# Patient Record
Sex: FEMALE | Race: WHITE | NOT HISPANIC OR LATINO | Employment: OTHER | URBAN - METROPOLITAN AREA
[De-identification: names, ages, dates, MRNs, and addresses within clinical notes are randomized per-mention and may not be internally consistent; named-entity substitution may affect disease eponyms.]

---

## 2021-04-04 ENCOUNTER — HOSPITAL ENCOUNTER (EMERGENCY)
Facility: CLINIC | Age: 66
Discharge: HOME | End: 2021-04-04
Attending: PHYSICIAN ASSISTANT | Admitting: PHYSICIAN ASSISTANT
Payer: COMMERCIAL

## 2021-04-04 VITALS
RESPIRATION RATE: 16 BRPM | SYSTOLIC BLOOD PRESSURE: 148 MMHG | HEART RATE: 82 BPM | OXYGEN SATURATION: 98 % | TEMPERATURE: 97.9 F | DIASTOLIC BLOOD PRESSURE: 115 MMHG

## 2021-04-04 DIAGNOSIS — T65.91XA: ICD-10-CM

## 2021-04-04 PROCEDURE — 99282 EMERGENCY DEPT VISIT SF MDM: CPT

## 2021-04-04 ASSESSMENT — ENCOUNTER SYMPTOMS
WOUND: 1
FEVER: 0
SHORTNESS OF BREATH: 0

## 2021-04-05 NOTE — DISCHARGE INSTRUCTIONS
You should take cetirizine (Zyrtec) 10 mg twice daily and famotidine (Pepcid) 20 mg twice daily as needed.  If your rash and itching become unbearable you can remove the Steri-Strips.  You should call your surgeon tomorrow.  You should return for fevers, shortness of breath, vomiting, facial swelling, significant worsening of rash, or other new or worsening symptoms you are concerned about.

## 2021-04-05 NOTE — ED PROVIDER NOTES
History   Chief Complaint:  Rash     The history is provided by the patient.      Vi Gee is a 66 year old female with a history of Parkinson's who presents with skin irritation. The patient reports that she recently had surgery on 03/31 to have the batteries replaced on her deep brain stimulation device. Her sites were dressed with surgical tape and gauze. She removed the gauze after two days as instructed, and was advised to leave the steri-strips on for 7-10 days following the procedure. The skin underneath the adhesive strips began itching as of two days ago, and has been worsening since. The patient voices concern that she may be allergic to the adhesive. She was prescribed antibiotics following her surgery but has not tried taking any medications to try and manage her symptoms. The patient denies any fever or shortness of breath. She has no rash elsewhere and denies drainage from her surgical sites.    Review of Systems   Constitutional: Negative for fever.   Respiratory: Negative for shortness of breath.    Skin: Positive for rash (under steri strips, denies rash elsewhere) and wound (denies drainage around sites).   All other systems reviewed and are negative.    Allergies:  Celecoxib  Clindamycin/Lincomycin  Epinephrine  Erythromycin    Medications:  Fluoxetine  Albuterol inhaler  Trazodone  Ropinirole  Tizanidine  Gabapentin  Sinemet  Duloxetine  Clonazepam    Past Medical History:    Parkinson's with dementia  Anxiety  COPD  Hyperparathyroidism  RLS  IBS  Chronic diarrhea  Hyperlipidemia  Stroke  Depression    Past Surgical History:    Deep brain stimulator placement  Appendectomy  Tonsillectomy and adenoidectomy  Left wrist procedure  Endometrial ablation  Uterine ablation  Vaginal mesh  Parathyroidectomy  Tubal ligation  Obstetrical surgery x3  [place] teeth extraction  Retinal reattachment  Bilateral cataract extraction  Bladder sling  Skin cancer excision  Craniectomy/craniotomy    Family  History:    Father - lung cancer, cataract, depression, IBS, macular degeneration  Mother - COPD, dementia, substance abuse, asthma, lung cancer, cataract, CAD, depression, hypertension, migraines, osteoarthritis   Sister - Parkinsonism, migraines    Social History:  The patient was accompanied to the ED by her .  Marital status:     Physical Exam     Patient Vitals for the past 24 hrs:   BP Temp Temp src Pulse Resp SpO2   04/04/21 1904 (!) 148/115 97.9  F (36.6  C) Temporal 82 16 98 %       Physical Exam  General: Alert and cooperative with exam. Resting comfortably on gurney  Head:  Scalp is NC/AT  Eyes:  No scleral icterus, PERRL with normal tracking  ENT:  The external nose and ears are normal. No angioedema of tongue, lips, uvula.  Neck:  Normal range of motion without rigidity.  Chest:  Normal effort.  No tachypnea or distress.  Skin:  There is mild redness surrounding 2 incision sites on either side of the upper anterior chest wall overlying stimulator implants.  This is symmetric.  Steri-strips overlying both suture sites.  No visualized purulence, fluctuance, crepiance or ttp.  Neuro:  No gross motor deficits.    No facial asymmetry.  GCS: 15.   Psych: Awake. Alert. Normal affect. Appropriate interactions.          Emergency Department Course     Emergency Department Course:    Reviewed:  I reviewed the patient's nursing notes, vitals, past medical history and care everywhere.     Assessments:  1949 I performed an exam of the patient in room 36 as documented above.    Disposition:  The patient was discharged to home.     Impression & Plan   Medical Decision Making:  Vi Gee is a 66 year old female who presents with redness at sites of steri-strips over BL chest wall sites.  This is consistent with localized allergic skin reaction.  This does seem most likely secondary to steri-strips.  Offered to remove strips but pt prefers to leave on per surgeon instructions.  Will treat  symptomatically with antihistamines.  She will call her surgeon tomorrow to discuss whether she can remove steri-strips.  No evidence of cellulitis, or wound infection.  No signs of candidal infection.  No evidence of more generalized allergic reaction. Return precautions given for spreading redness, swelling, purulent drainage, fever, difficulty breathing, vomiting, dizziness, generalized rash, etc.  Diagnosis:    ICD-10-CM    1. Allergic reaction to chemical substance  T65.91XA        Discharge Medications:   None.      Scribe Disclosure:  I, Elizabeth Bragg, am serving as a scribe at 7:49 PM on 4/4/2021 to document services personally performed by Vahe Jama PA-C based on my observations and the provider's statements to me.     This note was completed in part using Dragon voice recognition software. Although reviewed after completion, some word and grammatical errors may occur.     Elizabeth Bragg  4/4/2021   [facility] EMERGENCY DEPARTMENT         Vahe Jama PA-C  04/05/21 1218

## 2021-06-07 VITALS
TEMPERATURE: 97.3 F | HEART RATE: 99 BPM | DIASTOLIC BLOOD PRESSURE: 79 MMHG | RESPIRATION RATE: 22 BRPM | SYSTOLIC BLOOD PRESSURE: 178 MMHG | OXYGEN SATURATION: 94 %

## 2021-06-07 LAB
ANION GAP SERPL CALCULATED.3IONS-SCNC: 2 MMOL/L (ref 3–14)
BASOPHILS # BLD AUTO: 0 10E9/L (ref 0–0.2)
BASOPHILS NFR BLD AUTO: 0.4 %
BUN SERPL-MCNC: 14 MG/DL (ref 7–30)
CALCIUM SERPL-MCNC: 8.7 MG/DL (ref 8.5–10.1)
CHLORIDE SERPL-SCNC: 108 MMOL/L (ref 94–109)
CO2 SERPL-SCNC: 32 MMOL/L (ref 20–32)
CREAT SERPL-MCNC: 0.94 MG/DL (ref 0.52–1.04)
DIFFERENTIAL METHOD BLD: NORMAL
EOSINOPHIL # BLD AUTO: 0.2 10E9/L (ref 0–0.7)
EOSINOPHIL NFR BLD AUTO: 2.3 %
ERYTHROCYTE [DISTWIDTH] IN BLOOD BY AUTOMATED COUNT: 13.2 % (ref 10–15)
GFR SERPL CREATININE-BSD FRML MDRD: 63 ML/MIN/{1.73_M2}
GLUCOSE SERPL-MCNC: 146 MG/DL (ref 70–99)
HCT VFR BLD AUTO: 40.8 % (ref 35–47)
HGB BLD-MCNC: 13.3 G/DL (ref 11.7–15.7)
IMM GRANULOCYTES # BLD: 0 10E9/L (ref 0–0.4)
IMM GRANULOCYTES NFR BLD: 0.4 %
LYMPHOCYTES # BLD AUTO: 1.8 10E9/L (ref 0.8–5.3)
LYMPHOCYTES NFR BLD AUTO: 21.9 %
MCH RBC QN AUTO: 29.5 PG (ref 26.5–33)
MCHC RBC AUTO-ENTMCNC: 32.6 G/DL (ref 31.5–36.5)
MCV RBC AUTO: 91 FL (ref 78–100)
MONOCYTES # BLD AUTO: 0.4 10E9/L (ref 0–1.3)
MONOCYTES NFR BLD AUTO: 5.1 %
NEUTROPHILS # BLD AUTO: 5.8 10E9/L (ref 1.6–8.3)
NEUTROPHILS NFR BLD AUTO: 69.9 %
NRBC # BLD AUTO: 0 10*3/UL
NRBC BLD AUTO-RTO: 0 /100
PLATELET # BLD AUTO: 279 10E9/L (ref 150–450)
POTASSIUM SERPL-SCNC: 3.5 MMOL/L (ref 3.4–5.3)
RBC # BLD AUTO: 4.51 10E12/L (ref 3.8–5.2)
SODIUM SERPL-SCNC: 142 MMOL/L (ref 133–144)
WBC # BLD AUTO: 8.2 10E9/L (ref 4–11)

## 2021-06-07 PROCEDURE — 99283 EMERGENCY DEPT VISIT LOW MDM: CPT | Mod: 25

## 2021-06-07 PROCEDURE — 80076 HEPATIC FUNCTION PANEL: CPT | Performed by: EMERGENCY MEDICINE

## 2021-06-07 PROCEDURE — 96360 HYDRATION IV INFUSION INIT: CPT

## 2021-06-07 PROCEDURE — 83690 ASSAY OF LIPASE: CPT | Performed by: EMERGENCY MEDICINE

## 2021-06-07 PROCEDURE — 80048 BASIC METABOLIC PNL TOTAL CA: CPT | Performed by: EMERGENCY MEDICINE

## 2021-06-07 PROCEDURE — 85025 COMPLETE CBC W/AUTO DIFF WBC: CPT | Performed by: EMERGENCY MEDICINE

## 2021-06-08 ENCOUNTER — HOSPITAL ENCOUNTER (EMERGENCY)
Facility: CLINIC | Age: 66
Discharge: HOME | End: 2021-06-08
Attending: EMERGENCY MEDICINE | Admitting: EMERGENCY MEDICINE
Payer: COMMERCIAL

## 2021-06-08 DIAGNOSIS — R51.9 NONINTRACTABLE HEADACHE, UNSPECIFIED CHRONICITY PATTERN, UNSPECIFIED HEADACHE TYPE: ICD-10-CM

## 2021-06-08 DIAGNOSIS — R19.7 DIARRHEA, UNSPECIFIED TYPE: ICD-10-CM

## 2021-06-08 DIAGNOSIS — B35.9 TINEA: ICD-10-CM

## 2021-06-08 LAB
ALBUMIN SERPL-MCNC: 3.5 G/DL (ref 3.4–5)
ALP SERPL-CCNC: 90 U/L (ref 40–150)
ALT SERPL W P-5'-P-CCNC: 11 U/L (ref 0–50)
AST SERPL W P-5'-P-CCNC: 24 U/L (ref 0–45)
BILIRUB DIRECT SERPL-MCNC: <0.1 MG/DL (ref 0–0.2)
BILIRUB SERPL-MCNC: 0.2 MG/DL (ref 0.2–1.3)
LIPASE SERPL-CCNC: 134 U/L (ref 73–393)
PROT SERPL-MCNC: 7.5 G/DL (ref 6.8–8.8)

## 2021-06-08 PROCEDURE — 250N000013 HC RX MED GY IP 250 OP 250 PS 637: Performed by: EMERGENCY MEDICINE

## 2021-06-08 PROCEDURE — 258N000003 HC RX IP 258 OP 636: Performed by: EMERGENCY MEDICINE

## 2021-06-08 RX ORDER — ACETAMINOPHEN 325 MG/1
975 TABLET ORAL ONCE
Status: COMPLETED | OUTPATIENT
Start: 2021-06-08 | End: 2021-06-08

## 2021-06-08 RX ORDER — CLOTRIMAZOLE 1 %
CREAM (GRAM) TOPICAL 2 TIMES DAILY
Qty: 45 G | Refills: 0 | Status: SHIPPED | OUTPATIENT
Start: 2021-06-08 | End: 2021-06-23

## 2021-06-08 RX ADMIN — ACETAMINOPHEN 975 MG: 325 TABLET, FILM COATED ORAL at 00:35

## 2021-06-08 RX ADMIN — SODIUM CHLORIDE 1000 ML: 9 INJECTION, SOLUTION INTRAVENOUS at 00:35

## 2021-06-08 ASSESSMENT — ENCOUNTER SYMPTOMS
FEVER: 0
VOMITING: 0
HEADACHES: 1
ABDOMINAL PAIN: 0
DIARRHEA: 1
CHILLS: 0
NAUSEA: 0

## 2021-06-08 NOTE — ED PROVIDER NOTES
"  History   Chief Complaint:  Headache and Rash     HPI   Vi Gee is a 66 year old female with history of Parkinson's, COPD, hyperlipidemia, and stroke who presents with headache and rash. For the past three days, the patient states that she has been experiencing intermittent headaches that she describes as \"aching.\" This morning, the patient reports noticing a rash on her lower abdomen. Throughout the day today, she reports that she experienced four episodes of diarrhea. She reports that she presented to the emergency department today for the persistance of these symptoms. Currently, she reports that her headache is a 3/10. She denies any abdominal pain, nausea, vomiting, visual disturbance, fever, chills, or any other symptoms.       Review of Systems   Constitutional: Negative for chills and fever.   Eyes: Negative for visual disturbance.   Gastrointestinal: Positive for diarrhea. Negative for abdominal pain, nausea and vomiting.   Skin: Positive for rash.   Neurological: Positive for headaches.   All other systems reviewed and are negative.        Allergies:  Celecoxib  Clindamycin/Lincomycin  Epinephrine  Erythromycin    Medications:  Fluoxetine  Albuterol inhaler  Trazodone  Ropinirole  Tizanidine  Gabapentin  Sinemet  Duloxetine  Clonazepam    Past Medical History:    Parkinson's with dementia  Anxiety  COPD  Hyperparathyroidism  RLS  IBS  Chronic diarrhea  Hyperlipidemia  Stroke  Depression      Past Surgical History:    Deep brain stimulator placement  Appendectomy  Tonsillectomy and adenoidectomy  Left wrist procedure  Endometrial ablation  Uterine ablation  Vaginal mesh  Parathyroidectomy  Tubal ligation  Obstetrical surgery x3  [place] teeth extraction  Retinal reattachment  Bilateral cataract extraction  Bladder sling  Skin cancer excision  Craniectomy/craniotomy    Family History:    Father - lung cancer, cataract, depression, IBS, macular degeneration  Mother - COPD, dementia, substance abuse, " asthma, lung cancer, cataract, CAD, depression, hypertension, migraines, osteoarthritis   Sister - Parkinsonism, migraines    Social History:  The patient presents to the emergency department alone and is .    Physical Exam     Patient Vitals for the past 24 hrs:   BP Temp Temp src Pulse Resp SpO2   06/07/21 2200 (!) 178/79 97.3  F (36.3  C) Temporal 99 22 94 %       Physical Exam  Constitutional:  Oriented to person, place, and time. Well-appearing.  HENT:   Head:    Normocephalic.   Mouth/Throat:   Oropharynx is clear and moist. No erythema.  Eyes:    EOM are normal. Pupils are equal, round, and reactive to light.   Neck:    Neck supple.   Cardiovascular:  Normal rate, regular rhythm and normal heart sounds.      Exam reveals no gallop and no friction rub.       No murmur heard.  Pulmonary/Chest:  Effort normal and breath sounds normal.      No respiratory distress. No wheezes. No rales.      No reproducible chest wall pain.  Abdominal:   Soft. No distension. No tenderness. No rebound and no guarding.   Musculoskeletal:  Normal range of motion.   Neurological:   Alert and oriented to person, place, and time.           Moves all 4 extremities spontaneously       Cranial nerves intact. No meningeal signs.  Skin:    Mild erythematous blanching.      Satellite lesion is noted in the periumbilical area.     No swelling. No fluctuation or induration.    Emergency Department Course     Laboratory:     CBC: WBC 8.2, HGB 13.3,     BMP: Anion Gap 2 (L), Glucose 146 (H), o/w WNL (Creatinine 0.94)      Hepatic Panel: AWNL    Lipase: 134    Emergency Department Course:    Reviewed:  I reviewed nursing notes, vitals, past medical history and care everywhere.    Assessments:  0015 I obtained history and examined the patient as noted above.     0122 I rechecked the patient and explained findings.     Interventions:  0035 NS, 1 L, IV  0035 Tylenol 975 mg PO    Disposition:  The patient was discharged to home.        Impression & Plan             Medical Decision Making:  Vi Gee is a 66 year old female with history of chronic diarrhea who presents with four episodes of loose bowel movements. She is also complaining of a mild headache over the past few days and a rash along her periumbilical region. In regards to her headache, it is mild at this time and currently improved after interventions. There are no worrisome signs. No fever, no meningeal signs, no nausea, vomiting, or photophobia to suggest meningitis or intracranial hemorrhage. For this reason, no further imaging was indicated. In regards to her diarrhea, which is chronic, she has no risk factors to suggest bacterial infection or c. Difficile colitis. Lab work is otherwise reassuring,. She also has what appears to be a tinea fungal infection noted along her periumbilical region. I would doubt cellulitis. I will send her home with a course of Clotrimazole. She is appropriate for outpatient management. She was instructed to return for any new or worsening symptoms.      Covid-19  Vi Gee was evaluated during a global COVID-19 pandemic, which necessitated consideration that the patient might be at risk for infection with the SARS-CoV-2 virus that causes COVID-19.   Applicable protocols for evaluation were followed during the patient's care.       Diagnosis:    ICD-10-CM    1. Nonintractable headache, unspecified chronicity pattern, unspecified headache type  R51.9    2. Diarrhea, unspecified type  R19.7    3. Tinea  B35.9        Discharge Medications:  New Prescriptions    CLOTRIMAZOLE (LOTRIMIN) 1 % EXTERNAL CREAM    Apply topically 2 times daily for 15 days       Scribe Disclosure:  I, Arpan Mauricio, am serving as a scribe at 12:15 AM on 6/8/2021 to document services personally performed by Junior Grady MD based on my observations and the provider's statements to me.              Junior Grady MD  06/08/21 [id number]

## 2021-06-08 NOTE — DISCHARGE INSTRUCTIONS
Discharge Instructions  Headache    You were seen today for a headache. Headaches may be caused by many different things such as muscle tension, sinus inflammation, anxiety and stress, having too little sleep, too much alcohol, some medical conditions or injury. You may have a migraine, which is caused by changes in the blood vessels in your head.  At this time your provider does not find that your headache is a sign of anything dangerous or life-threatening.  However, sometimes the signs of serious illness do not show up right away.      Generally, every Emergency Department visit should have a follow-up clinic visit with either a primary or a specialty clinic/provider. Please follow-up as instructed by your emergency provider today.    Return to the Emergency Department if:  You get a new fever of 100.4 F or higher.  Your headache gets much worse.  You get a stiff neck with your headache.  You get a new headache that is significantly different or worse than headaches you have had before.  You are vomiting (throwing up) and cannot keep food or water down.  You have blurry or double vision or other problems with your eyes.  You have a new weakness on one side of your body.  You have difficulty with balance which is new.  You or your family thinks you are confused.  You have a seizure.    What can I do to help myself?  Pain medications - You may take a pain medication such as Tylenol  (acetaminophen), Advil , Motrin  (ibuprofen) or Aleve  (naproxen).  Take a pain reliever as soon as you notice symptoms.  Starting medications as soon as you start to have symptoms may lessen the amount of pain you have.  Relaxing in a quiet, dark room may help.  Get enough sleep and eat meals regularly.  You may need to watch for certain foods or other things which may trigger your headaches.  Keeping a journal of your headaches and possible triggers may help you and your primary provider to identify things which you should avoid which  may be causing your headaches.  If you were given a prescription for medicine here today, be sure to read all of the information (including the package insert) that comes with your prescription.  This will include important information about the medicine, its side effects, and any warnings that you need to know about.  The pharmacist who fills the prescription can provide more information and answer questions you may have about the medicine.  If you have questions or concerns that the pharmacist cannot address, please call or return to the Emergency Department.   Remember that you can always come back to the Emergency Department if you are not able to see your regular provider in the amount of time listed above, if you get any new symptoms, or if there is anything that worries you.    Discharge Instructions  Adult Diarrhea    You have been seen today for diarrhea (loose stools). This is usually caused by a virus, but some bacteria, parasites, medicines, or other medical conditions can cause similar symptoms. At this time your provider does not find that your diarrhea is a sign of anything dangerous or life-threatening. However, sometimes the signs of serious illness do not show up right away. If you have new or worse symptoms, you may need to be seen again in the Emergency Department or by your primary provider.     Generally, every Emergency Department visit should have a follow-up clinic visit with either a primary or a specialty clinic/provider. Please follow-up as instructed by your emergency provider today.    Return to the Emergency Department if:  You feel you are getting dehydrated, such as being very thirsty, not urinating (peeing) like usual, or feeling faint or lightheaded.   You develop a new fever.  You have abdominal (belly) pain that seems worse than cramps, is in one spot, or is getting worse over time.   You have blood in your stool or your stool becomes black.  (Remember that if you take  "Pepto-Bismol , this will turn your stool black).   You feel very weak.    What can I do to help myself?  The most important thing to do is to drink clear liquids.   It is best to have only small, frequent sips of liquids. Drinking too much at once may cause more diarrhea. You should also replace minerals, sodium and potassium lost with diarrhea. Pedialyte  and sports drinks can help you replace these minerals. You can also drink clear liquids such as water, weak tea, apple juice, and 7-Up . Avoid acidic liquids (orange juice), caffeine (coffee) or alcohol. Milk products will make the diarrhea worse.  Eat bland (plain) foods. Soda crackers, toast, plain noodles, gelatin, applesauce and bananas are good first choices. Avoid foods that have acid, are spicy, fatty or fibrous (such as meats, coarse grains, vegetables). You may start eating these foods again in about 3 days when you are better.   Sometimes treatment includes prescription medicine to prevent diarrhea. If your provider prescribes these for you, take them as directed.   Nonprescription medicine is available for the treatment of diarrhea and can be very effective. If you use it, make sure you use the dose recommended on the package. Check with your healthcare provider before you use any medicine for diarrhea.   Do not take ibuprofen, or other nonsteroidal anti-inflammatory medicines, without checking with your healthcare provider.   Probiotics: If you have been given an antibiotic, you may want to also take a probiotic pill or eat yogurt with live cultures. Probiotics have \"good bacteria\" to help your intestines stay healthy. Studies have shown that probiotics help prevent diarrhea and other intestine problems (including C. diff infection) when you take antibiotics. You can buy these without a prescription in the pharmacy section of the store.   If you were given a prescription for medicine here today, be sure to read all of the information (including the " package insert) that comes with your prescription.  This will include important information about the medicine, its side effects, and any warnings that you need to know about.  The pharmacist who fills the prescription can provide more information and answer questions you may have about the medicine.  If you have questions or concerns that the pharmacist cannot address, please call or return to the Emergency Department.  Remember that you can always come back to the Emergency Department if you are not able to see your regular provider in the amount of time listed above, if you get any new symptoms, or if there is anything that worries you.

## 2021-06-08 NOTE — ED TRIAGE NOTES
A&O x4.  ABC's intact.      Pt arrives with c/o diarrhea that started today, headache x 3 days, thrush and shiny redness on belly button fold.

## 2021-09-21 ENCOUNTER — HOSPITAL ENCOUNTER (EMERGENCY)
Facility: CLINIC | Age: 66
Discharge: HOME | End: 2021-09-21
Attending: NURSE PRACTITIONER | Admitting: NURSE PRACTITIONER
Payer: COMMERCIAL

## 2021-09-21 VITALS
HEART RATE: 82 BPM | RESPIRATION RATE: 18 BRPM | SYSTOLIC BLOOD PRESSURE: 147 MMHG | OXYGEN SATURATION: 96 % | DIASTOLIC BLOOD PRESSURE: 69 MMHG | TEMPERATURE: 98 F

## 2021-09-21 DIAGNOSIS — M54.2 NECK PAIN: ICD-10-CM

## 2021-09-21 DIAGNOSIS — H93.11 TINNITUS, RIGHT: ICD-10-CM

## 2021-09-21 PROCEDURE — 99282 EMERGENCY DEPT VISIT SF MDM: CPT

## 2021-09-21 ASSESSMENT — ENCOUNTER SYMPTOMS
DIZZINESS: 0
FACIAL ASYMMETRY: 0
CHEST TIGHTNESS: 0
NECK PAIN: 1
SPEECH DIFFICULTY: 0

## 2021-09-21 NOTE — ED PROVIDER NOTES
History   Chief Complaint:  Tinnitus     The history is provided by the patient and the spouse.      Vi Gee is a 66 year old female with history of TIA, Parkinson's disease, neurologic disorder, type II diabetes, and hearing loss who presents with right-sided tinnitus and neck pain, which have since resolved. She was sitting in the car when she suddenly developed sharp neck pain and ringing in her right ear. Both were bothersome and painful. She explains that she no longer has ringing in her ear and no ear pain or fullness currently. She notes the right side of her neck feels sore but the acute onset pain she had is now gone. She is not experiencing difficulties with vision or speech and she does not have any facial asymmetry. She denies any recent falls or activity changes. She denies chest pain and dizziness.     Review of Systems   HENT: Positive for ear pain.    Eyes: Negative for visual disturbance.   Respiratory: Negative for chest tightness.    Musculoskeletal: Positive for neck pain.   Neurological: Negative for dizziness, facial asymmetry and speech difficulty.   All other systems reviewed and are negative.    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      Depressive Disorder    Anxiety Disorder  Unspecified cataract      Obstructive chronic bronchitis without exacerbation      Pure hypercholesterolemia      Unspecified retinal detachment      Vitreous degeneration      Type II or unspecified type diabetes mellitus without mention of complication, not stated as uncontrolled    Urinary incontinence, resolved April 2009 surgically  Dry eyes     Hearing loss      Mastoiditis      Infection  of the inner ear      Tonsillitis          GERD           Insomnia      Arrhythmia      Memory loss      Obesity       Skin cancer, basal  Unspecified sleep apnea      Liver disease, fatty  TIA  Salmonella     Psychiatric disorder      Heart murmur      Head, face & neck injury      Gastrointestinal disorder      Sleep apnea      Immune disorder      Neurological disorder      Caries      Gum disease      Perforated tympanic     Past Surgical History:    Appendectomy  LIG fallopian tube abd/vag uni/juanjose  Obstetrical care  Tonsillectomy prim/sec; under age 11  [place] teeth extraction  Scope wrist surg; inf lavage&drain  Transvaginal sling  Prophylaxis ret detach; photocoag  Replacement deep brain stimulator generator (bilateral)  Extracapsular cataract remv IOL, right  Extracapsular cataract remv IOL, left  Parathyroidectomy  Deep brain stimulator placement  Incontinence surgery  Appendectomy  Skin cancer excision  Breast biopsy    Family History:    Father: lung cancer, jaw cancer, cataracts, depression, genetic disorder, IBD, macular degeneration  Mother: alcohol/drug abuse, asthma, COPD, lung cancer, cataracts, CAD, dementia, depression, hearing loss, hypertension, migraines, osteoarthritis, osteoporosis, stroke  Sister: migraines    Social History:  PCP: Kristina Lockwood  Presents with her   No history of smoking  Rare alcohol use    Physical Exam     Patient Vitals for the past 24 hrs:   BP Temp Temp src Pulse Resp SpO2   09/21/21 1413 (!) 147/69 98  F (36.7  C) Temporal 82 18 96 %       Physical Exam  Physical Exam   Constitutional:  Laying in bed, non-toxic appearing. Moving head and neck normally.   Head: Head moves freely with normal range of motion.   ENT: Oropharynx is clear and moist.   Eyes: Conjunctivae pink. EOMs intact. PERRL. No horizontal or vertical nystagmus.   Neck: Normal range of motion. No nuchal rigidity. Tenderness at the right trapezius muscle.   Cardiovascular: Regular rate and  "rhythm. Normal heart sounds. No concerning murmur. Intact distal pulses: radial pulses 2+ on the right, 2+ on the left.   Pulmonary/Chest: No respiratory distress. No decreased breath sounds. Lungs clear throughout.   Abdominal: Soft. Non-tender. No rebound, no guarding.   Musculoskeletal: No peripheral edema. Distal capillary refill and sensation intact.   Neurological: Oriented to person, place, and time. CN II-XII intact. No facial asymmetry. No dysarthria. No trunchal instability. Right upper and lower extremity strength is equal bilaterally, although she cannot lift her right leg as high as her left, she notes feeling weaker on her right side which is baseline with her Parkinson's per she and her . Ambulation is steady  Skin: Skin is warm and normal in color. No diaphoresis. No rash noted.      Emergency Department Course     Emergency Department Course:    Reviewed:  I reviewed nursing notes, vitals, past medical history and care everywhere    Assessments:  1525 I obtained history and examined the patient as noted above.     Disposition:  The patient was discharged to home.     Impression & Plan     Medical Decision Making:  Vi Gee is a 66 year old female with acute onset right sided neck pain and ringing in her right ear today while being a passenger in the car. These symptoms have since resolved. She and her  note a history of TIA in which she had a right facial droop that resolved. I cannot find this visit in her chart review. She has deep brain stimulators, so she cannot undergo MRI nor do I feel she needs to at this time. I am not concerned about a CVA or TIA. I did offer CT head and they declined. Her symptoms have resolved. I suspect muscle strain of the neck. I offered Tylenol or Ibuprofen and a muscle relaxer and she would prefer to take nothing since she feels well and \"barely has symptoms\" per her report. We discussed reasons to return to the ER and need for primary care follow " up. She and her  are amenable to plan.     Covid-19  Vi Gee was evaluated during a global COVID-19 pandemic, which necessitated consideration that the patient might be at risk for infection with the SARS-CoV-2 virus that causes COVID-19.   Applicable protocols for evaluation were followed during the patient's care. COVID-19 was considered as part of the patient's evaluation.     Diagnosis:    ICD-10-CM    1. Neck pain  M54.2     resolved   2. Tinnitus, right  H93.11     resolved     Scribe Disclosure:  I, Aubire Harrison, am serving as a scribe at 3:25 PM on 9/21/2021 to document services personally performed by Debora Granado NP based on my observations and the provider's statements to me.          Debora Granado, TAMIE CNP  09/21/21 1810

## 2021-09-21 NOTE — DISCHARGE INSTRUCTIONS
Return here for any changes in speech, vision, strength or ability to ambulate as you normally would.

## 2021-09-21 NOTE — ED TRIAGE NOTES
Pt presents to the ER with c/o right sided neck/ear pain and ringing in the ear. Denies dizziness, hx of parkinson's.

## 2021-12-08 ENCOUNTER — HOSPITAL ENCOUNTER (INPATIENT)
Facility: CLINIC | Age: 66
LOS: 4 days | Discharge: HOME | DRG: 193 | End: 2021-12-12
Attending: EMERGENCY MEDICINE | Admitting: INTERNAL MEDICINE
Payer: COMMERCIAL

## 2021-12-08 ENCOUNTER — APPOINTMENT (OUTPATIENT)
Dept: CT IMAGING | Facility: CLINIC | Age: 66
DRG: 193 | End: 2021-12-08
Attending: EMERGENCY MEDICINE
Payer: COMMERCIAL

## 2021-12-08 ENCOUNTER — APPOINTMENT (OUTPATIENT)
Dept: GENERAL RADIOLOGY | Facility: CLINIC | Age: 66
DRG: 193 | End: 2021-12-08
Attending: EMERGENCY MEDICINE
Payer: COMMERCIAL

## 2021-12-08 DIAGNOSIS — J18.9 PNEUMONIA DUE TO INFECTIOUS ORGANISM, UNSPECIFIED LATERALITY, UNSPECIFIED PART OF LUNG: ICD-10-CM

## 2021-12-08 DIAGNOSIS — R19.7 DIARRHEA, UNSPECIFIED TYPE: Primary | ICD-10-CM

## 2021-12-08 DIAGNOSIS — J96.01 ACUTE RESPIRATORY FAILURE WITH HYPOXIA (H): ICD-10-CM

## 2021-12-08 PROBLEM — R09.02 HYPOXIA: Status: ACTIVE | Noted: 2021-12-08

## 2021-12-08 LAB
ALBUMIN SERPL-MCNC: 3.6 G/DL (ref 3.4–5)
ALBUMIN UR-MCNC: NEGATIVE MG/DL
ALP SERPL-CCNC: 100 U/L (ref 40–150)
ALT SERPL W P-5'-P-CCNC: 9 U/L (ref 0–50)
ANION GAP SERPL CALCULATED.3IONS-SCNC: 6 MMOL/L (ref 3–14)
APPEARANCE UR: CLEAR
AST SERPL W P-5'-P-CCNC: 18 U/L (ref 0–45)
BASOPHILS # BLD AUTO: 0 10E3/UL (ref 0–0.2)
BASOPHILS NFR BLD AUTO: 0 %
BILIRUB SERPL-MCNC: 0.5 MG/DL (ref 0.2–1.3)
BILIRUB UR QL STRIP: NEGATIVE
BUN SERPL-MCNC: 10 MG/DL (ref 7–30)
CALCIUM SERPL-MCNC: 8.8 MG/DL (ref 8.5–10.1)
CHLORIDE BLD-SCNC: 102 MMOL/L (ref 94–109)
CO2 SERPL-SCNC: 29 MMOL/L (ref 20–32)
COLOR UR AUTO: ABNORMAL
CREAT SERPL-MCNC: 0.85 MG/DL (ref 0.52–1.04)
EOSINOPHIL # BLD AUTO: 0.2 10E3/UL (ref 0–0.7)
EOSINOPHIL NFR BLD AUTO: 1 %
ERYTHROCYTE [DISTWIDTH] IN BLOOD BY AUTOMATED COUNT: 13.3 % (ref 10–15)
FLUAV RNA SPEC QL NAA+PROBE: NEGATIVE
FLUBV RNA RESP QL NAA+PROBE: NEGATIVE
GFR SERPL CREATININE-BSD FRML MDRD: 71 ML/MIN/1.73M2
GLUCOSE BLD-MCNC: 110 MG/DL (ref 70–99)
GLUCOSE BLDC GLUCOMTR-MCNC: 136 MG/DL (ref 70–99)
GLUCOSE BLDC GLUCOMTR-MCNC: 227 MG/DL (ref 70–99)
GLUCOSE BLDC GLUCOMTR-MCNC: 236 MG/DL (ref 70–99)
GLUCOSE UR STRIP-MCNC: NEGATIVE MG/DL
HCT VFR BLD AUTO: 43 % (ref 35–47)
HGB BLD-MCNC: 13.8 G/DL (ref 11.7–15.7)
HGB UR QL STRIP: NEGATIVE
HOLD SPECIMEN: NORMAL
HOLD SPECIMEN: NORMAL
IMM GRANULOCYTES # BLD: 0.1 10E3/UL
IMM GRANULOCYTES NFR BLD: 1 %
KETONES UR STRIP-MCNC: NEGATIVE MG/DL
LACTATE SERPL-SCNC: 1.4 MMOL/L (ref 0.7–2)
LACTATE SERPL-SCNC: 1.8 MMOL/L (ref 0.7–2)
LEUKOCYTE ESTERASE UR QL STRIP: NEGATIVE
LYMPHOCYTES # BLD AUTO: 0.8 10E3/UL (ref 0.8–5.3)
LYMPHOCYTES NFR BLD AUTO: 6 %
MCH RBC QN AUTO: 30.2 PG (ref 26.5–33)
MCHC RBC AUTO-ENTMCNC: 32.1 G/DL (ref 31.5–36.5)
MCV RBC AUTO: 94 FL (ref 78–100)
MONOCYTES # BLD AUTO: 0.9 10E3/UL (ref 0–1.3)
MONOCYTES NFR BLD AUTO: 7 %
MUCOUS THREADS #/AREA URNS LPF: PRESENT /LPF
NEUTROPHILS # BLD AUTO: 10.6 10E3/UL (ref 1.6–8.3)
NEUTROPHILS NFR BLD AUTO: 85 %
NITRATE UR QL: NEGATIVE
NRBC # BLD AUTO: 0 10E3/UL
NRBC BLD AUTO-RTO: 0 /100
NT-PROBNP SERPL-MCNC: 63 PG/ML (ref 0–900)
PH UR STRIP: 5.5 [PH] (ref 5–7)
PLATELET # BLD AUTO: 272 10E3/UL (ref 150–450)
POTASSIUM BLD-SCNC: 4.3 MMOL/L (ref 3.4–5.3)
PROT SERPL-MCNC: 8 G/DL (ref 6.8–8.8)
RBC # BLD AUTO: 4.57 10E6/UL (ref 3.8–5.2)
RBC URINE: <1 /HPF
SARS-COV-2 RNA RESP QL NAA+PROBE: NEGATIVE
SODIUM SERPL-SCNC: 137 MMOL/L (ref 133–144)
SP GR UR STRIP: 1.01 (ref 1–1.03)
SQUAMOUS EPITHELIAL: <1 /HPF
TROPONIN I SERPL HS-MCNC: 4 NG/L
UROBILINOGEN UR STRIP-MCNC: NORMAL MG/DL
WBC # BLD AUTO: 12.6 10E3/UL (ref 4–11)
WBC URINE: <1 /HPF

## 2021-12-08 PROCEDURE — 82040 ASSAY OF SERUM ALBUMIN: CPT | Performed by: EMERGENCY MEDICINE

## 2021-12-08 PROCEDURE — 250N000011 HC RX IP 250 OP 636: Performed by: INTERNAL MEDICINE

## 2021-12-08 PROCEDURE — 96361 HYDRATE IV INFUSION ADD-ON: CPT

## 2021-12-08 PROCEDURE — 83605 ASSAY OF LACTIC ACID: CPT | Performed by: INTERNAL MEDICINE

## 2021-12-08 PROCEDURE — 84484 ASSAY OF TROPONIN QUANT: CPT | Performed by: EMERGENCY MEDICINE

## 2021-12-08 PROCEDURE — 258N000003 HC RX IP 258 OP 636: Performed by: EMERGENCY MEDICINE

## 2021-12-08 PROCEDURE — 250N000009 HC RX 250: Performed by: EMERGENCY MEDICINE

## 2021-12-08 PROCEDURE — 250N000013 HC RX MED GY IP 250 OP 250 PS 637: Performed by: EMERGENCY MEDICINE

## 2021-12-08 PROCEDURE — 36415 COLL VENOUS BLD VENIPUNCTURE: CPT | Performed by: EMERGENCY MEDICINE

## 2021-12-08 PROCEDURE — 96365 THER/PROPH/DIAG IV INF INIT: CPT | Mod: 59

## 2021-12-08 PROCEDURE — 36415 COLL VENOUS BLD VENIPUNCTURE: CPT | Performed by: INTERNAL MEDICINE

## 2021-12-08 PROCEDURE — 250N000011 HC RX IP 250 OP 636: Performed by: EMERGENCY MEDICINE

## 2021-12-08 PROCEDURE — 250N000013 HC RX MED GY IP 250 OP 250 PS 637: Performed by: INTERNAL MEDICINE

## 2021-12-08 PROCEDURE — 99223 1ST HOSP IP/OBS HIGH 75: CPT | Mod: AI | Performed by: INTERNAL MEDICINE

## 2021-12-08 PROCEDURE — 250N000012 HC RX MED GY IP 250 OP 636 PS 637: Performed by: INTERNAL MEDICINE

## 2021-12-08 PROCEDURE — 85025 COMPLETE CBC W/AUTO DIFF WBC: CPT | Performed by: EMERGENCY MEDICINE

## 2021-12-08 PROCEDURE — 99285 EMERGENCY DEPT VISIT HI MDM: CPT | Mod: 25

## 2021-12-08 PROCEDURE — 81001 URINALYSIS AUTO W/SCOPE: CPT | Performed by: EMERGENCY MEDICINE

## 2021-12-08 PROCEDURE — 71045 X-RAY EXAM CHEST 1 VIEW: CPT

## 2021-12-08 PROCEDURE — 87636 SARSCOV2 & INF A&B AMP PRB: CPT | Performed by: EMERGENCY MEDICINE

## 2021-12-08 PROCEDURE — 87040 BLOOD CULTURE FOR BACTERIA: CPT | Performed by: EMERGENCY MEDICINE

## 2021-12-08 PROCEDURE — 258N000003 HC RX IP 258 OP 636: Performed by: INTERNAL MEDICINE

## 2021-12-08 PROCEDURE — C9803 HOPD COVID-19 SPEC COLLECT: HCPCS

## 2021-12-08 PROCEDURE — 71275 CT ANGIOGRAPHY CHEST: CPT

## 2021-12-08 PROCEDURE — 120N000004 HC R&B MS OVERFLOW

## 2021-12-08 PROCEDURE — 83605 ASSAY OF LACTIC ACID: CPT | Performed by: EMERGENCY MEDICINE

## 2021-12-08 PROCEDURE — 83880 ASSAY OF NATRIURETIC PEPTIDE: CPT | Performed by: EMERGENCY MEDICINE

## 2021-12-08 PROCEDURE — 96375 TX/PRO/DX INJ NEW DRUG ADDON: CPT

## 2021-12-08 RX ORDER — GABAPENTIN 400 MG/1
1200 CAPSULE ORAL AT BEDTIME
Status: DISCONTINUED | OUTPATIENT
Start: 2021-12-08 | End: 2021-12-08

## 2021-12-08 RX ORDER — IOPAMIDOL 755 MG/ML
500 INJECTION, SOLUTION INTRAVASCULAR ONCE
Status: COMPLETED | OUTPATIENT
Start: 2021-12-08 | End: 2021-12-08

## 2021-12-08 RX ORDER — DEXAMETHASONE SODIUM PHOSPHATE 10 MG/ML
10 INJECTION, SOLUTION INTRAMUSCULAR; INTRAVENOUS ONCE
Status: COMPLETED | OUTPATIENT
Start: 2021-12-08 | End: 2021-12-08

## 2021-12-08 RX ORDER — GABAPENTIN 300 MG/1
300 CAPSULE ORAL 2 TIMES DAILY
COMMUNITY

## 2021-12-08 RX ORDER — NYSTATIN 100000/ML
5-10 SUSPENSION, ORAL (FINAL DOSE FORM) ORAL 4 TIMES DAILY
COMMUNITY
Start: 2021-12-07

## 2021-12-08 RX ORDER — BUPROPION HYDROCHLORIDE 150 MG/1
150 TABLET ORAL AT BEDTIME
Status: DISCONTINUED | OUTPATIENT
Start: 2021-12-08 | End: 2021-12-12 | Stop reason: HOSPADM

## 2021-12-08 RX ORDER — ROPINIROLE 2 MG/1
2 TABLET, FILM COATED ORAL 2 TIMES DAILY
Status: DISCONTINUED | OUTPATIENT
Start: 2021-12-08 | End: 2021-12-10

## 2021-12-08 RX ORDER — TRAZODONE HYDROCHLORIDE 100 MG/1
150 TABLET ORAL AT BEDTIME
COMMUNITY
Start: 2021-12-02

## 2021-12-08 RX ORDER — CLONAZEPAM 1 MG/1
1 TABLET ORAL AT BEDTIME
Status: DISCONTINUED | OUTPATIENT
Start: 2021-12-08 | End: 2021-12-10

## 2021-12-08 RX ORDER — SEMAGLUTIDE 1.34 MG/ML
INJECTION, SOLUTION SUBCUTANEOUS WEEKLY
COMMUNITY
Start: 2021-11-17

## 2021-12-08 RX ORDER — GABAPENTIN 300 MG/1
300 CAPSULE ORAL 2 TIMES DAILY
Status: DISCONTINUED | OUTPATIENT
Start: 2021-12-09 | End: 2021-12-12 | Stop reason: HOSPADM

## 2021-12-08 RX ORDER — NICOTINE POLACRILEX 4 MG
15-30 LOZENGE BUCCAL
Status: DISCONTINUED | OUTPATIENT
Start: 2021-12-08 | End: 2021-12-12 | Stop reason: HOSPADM

## 2021-12-08 RX ORDER — DEXTROSE MONOHYDRATE 25 G/50ML
25-50 INJECTION, SOLUTION INTRAVENOUS
Status: DISCONTINUED | OUTPATIENT
Start: 2021-12-08 | End: 2021-12-12 | Stop reason: HOSPADM

## 2021-12-08 RX ORDER — CEFTRIAXONE 2 G/1
2 INJECTION, POWDER, FOR SOLUTION INTRAMUSCULAR; INTRAVENOUS EVERY 24 HOURS
Status: DISCONTINUED | OUTPATIENT
Start: 2021-12-09 | End: 2021-12-10

## 2021-12-08 RX ORDER — ROPINIROLE 1 MG/1
1 TABLET, FILM COATED ORAL EVERY 24 HOURS
Status: DISCONTINUED | OUTPATIENT
Start: 2021-12-09 | End: 2021-12-10

## 2021-12-08 RX ORDER — CARBIDOPA AND LEVODOPA 25; 100 MG/1; MG/1
TABLET ORAL
COMMUNITY
Start: 2021-10-11

## 2021-12-08 RX ORDER — ACETAMINOPHEN 500 MG
1000 TABLET ORAL ONCE
Status: COMPLETED | OUTPATIENT
Start: 2021-12-08 | End: 2021-12-08

## 2021-12-08 RX ORDER — AMOXICILLIN 250 MG
2 CAPSULE ORAL 2 TIMES DAILY PRN
Status: DISCONTINUED | OUTPATIENT
Start: 2021-12-08 | End: 2021-12-12 | Stop reason: HOSPADM

## 2021-12-08 RX ORDER — DULOXETIN HYDROCHLORIDE 60 MG/1
60 CAPSULE, DELAYED RELEASE ORAL AT BEDTIME
COMMUNITY
Start: 2021-11-04

## 2021-12-08 RX ORDER — GABAPENTIN 300 MG/1
900 CAPSULE ORAL AT BEDTIME
Status: DISCONTINUED | OUTPATIENT
Start: 2021-12-08 | End: 2021-12-12 | Stop reason: HOSPADM

## 2021-12-08 RX ORDER — LIDOCAINE 40 MG/G
CREAM TOPICAL
Status: DISCONTINUED | OUTPATIENT
Start: 2021-12-08 | End: 2021-12-12 | Stop reason: HOSPADM

## 2021-12-08 RX ORDER — AMOXICILLIN 250 MG
1 CAPSULE ORAL 2 TIMES DAILY PRN
Status: DISCONTINUED | OUTPATIENT
Start: 2021-12-08 | End: 2021-12-12 | Stop reason: HOSPADM

## 2021-12-08 RX ORDER — CARBIDOPA AND LEVODOPA 25; 100 MG/1; MG/1
1 TABLET ORAL DAILY PRN
COMMUNITY

## 2021-12-08 RX ORDER — ONDANSETRON 2 MG/ML
4 INJECTION INTRAMUSCULAR; INTRAVENOUS EVERY 6 HOURS PRN
Status: DISCONTINUED | OUTPATIENT
Start: 2021-12-08 | End: 2021-12-12 | Stop reason: HOSPADM

## 2021-12-08 RX ORDER — CLONAZEPAM 0.5 MG/1
1 TABLET ORAL AT BEDTIME
COMMUNITY

## 2021-12-08 RX ORDER — CLONAZEPAM 0.5 MG/1
0.5 TABLET ORAL DAILY
Status: DISCONTINUED | OUTPATIENT
Start: 2021-12-09 | End: 2021-12-10

## 2021-12-08 RX ORDER — ROPINIROLE 2 MG/1
TABLET, FILM COATED ORAL
COMMUNITY
Start: 2021-12-02

## 2021-12-08 RX ORDER — ROPINIROLE 2 MG/1
2 TABLET, FILM COATED ORAL EVERY 24 HOURS
Status: DISCONTINUED | OUTPATIENT
Start: 2021-12-09 | End: 2021-12-10

## 2021-12-08 RX ORDER — CARBIDOPA AND LEVODOPA 25; 100 MG/1; MG/1
2 TABLET ORAL EVERY MORNING
Status: DISCONTINUED | OUTPATIENT
Start: 2021-12-09 | End: 2021-12-09

## 2021-12-08 RX ORDER — BUPROPION HYDROCHLORIDE 150 MG/1
150 TABLET ORAL DAILY
COMMUNITY
Start: 2021-12-02

## 2021-12-08 RX ORDER — CLONAZEPAM 0.5 MG/1
0.5 TABLET ORAL EVERY MORNING
COMMUNITY
Start: 2021-12-02

## 2021-12-08 RX ORDER — DULOXETIN HYDROCHLORIDE 60 MG/1
60 CAPSULE, DELAYED RELEASE ORAL AT BEDTIME
Status: DISCONTINUED | OUTPATIENT
Start: 2021-12-08 | End: 2021-12-12 | Stop reason: HOSPADM

## 2021-12-08 RX ORDER — ONDANSETRON 4 MG/1
4 TABLET, ORALLY DISINTEGRATING ORAL EVERY 6 HOURS PRN
Status: DISCONTINUED | OUTPATIENT
Start: 2021-12-08 | End: 2021-12-12 | Stop reason: HOSPADM

## 2021-12-08 RX ORDER — AZITHROMYCIN 250 MG/1
250 TABLET, FILM COATED ORAL DAILY
Status: COMPLETED | OUTPATIENT
Start: 2021-12-09 | End: 2021-12-12

## 2021-12-08 RX ORDER — ACETAMINOPHEN 650 MG/1
650 SUPPOSITORY RECTAL EVERY 6 HOURS PRN
Status: DISCONTINUED | OUTPATIENT
Start: 2021-12-08 | End: 2021-12-12 | Stop reason: HOSPADM

## 2021-12-08 RX ORDER — ALBUTEROL SULFATE 0.83 MG/ML
1 SOLUTION RESPIRATORY (INHALATION) EVERY 6 HOURS PRN
COMMUNITY
Start: 2021-11-30

## 2021-12-08 RX ORDER — SODIUM CHLORIDE 9 MG/ML
INJECTION, SOLUTION INTRAVENOUS CONTINUOUS
Status: DISCONTINUED | OUTPATIENT
Start: 2021-12-08 | End: 2021-12-09

## 2021-12-08 RX ORDER — GABAPENTIN 300 MG/1
1500 CAPSULE ORAL AT BEDTIME
COMMUNITY
Start: 2021-07-27

## 2021-12-08 RX ORDER — ACETAMINOPHEN 325 MG/1
650 TABLET ORAL EVERY 6 HOURS PRN
Status: DISCONTINUED | OUTPATIENT
Start: 2021-12-08 | End: 2021-12-12 | Stop reason: HOSPADM

## 2021-12-08 RX ORDER — DULOXETIN HYDROCHLORIDE 20 MG/1
CAPSULE, DELAYED RELEASE ORAL
Status: ON HOLD | COMMUNITY
Start: 2021-11-02 | End: 2021-12-08

## 2021-12-08 RX ORDER — POLYMYXIN B SULFATE AND TRIMETHOPRIM 1; 10000 MG/ML; [USP'U]/ML
2 SOLUTION OPHTHALMIC
COMMUNITY
Start: 2021-11-24 | End: 2021-12-15

## 2021-12-08 RX ORDER — AZITHROMYCIN 500 MG/5ML
500 INJECTION, POWDER, LYOPHILIZED, FOR SOLUTION INTRAVENOUS ONCE
Status: COMPLETED | OUTPATIENT
Start: 2021-12-08 | End: 2021-12-08

## 2021-12-08 RX ORDER — CEFTRIAXONE 2 G/1
2 INJECTION, POWDER, FOR SOLUTION INTRAMUSCULAR; INTRAVENOUS ONCE
Status: COMPLETED | OUTPATIENT
Start: 2021-12-08 | End: 2021-12-08

## 2021-12-08 RX ADMIN — GABAPENTIN 900 MG: 300 CAPSULE ORAL at 23:33

## 2021-12-08 RX ADMIN — SODIUM CHLORIDE 98 ML: 9 INJECTION, SOLUTION INTRAVENOUS at 11:22

## 2021-12-08 RX ADMIN — SODIUM CHLORIDE 1000 ML: 9 INJECTION, SOLUTION INTRAVENOUS at 09:48

## 2021-12-08 RX ADMIN — Medication 3 HALF-TAB: at 22:23

## 2021-12-08 RX ADMIN — CLONAZEPAM 1 MG: 1 TABLET ORAL at 22:23

## 2021-12-08 RX ADMIN — ACETAMINOPHEN 1000 MG: 500 TABLET, FILM COATED ORAL at 10:50

## 2021-12-08 RX ADMIN — TRAZODONE HYDROCHLORIDE 150 MG: 100 TABLET ORAL at 23:32

## 2021-12-08 RX ADMIN — SODIUM CHLORIDE: 9 INJECTION, SOLUTION INTRAVENOUS at 17:36

## 2021-12-08 RX ADMIN — Medication 3 HALF-TAB: at 20:35

## 2021-12-08 RX ADMIN — INSULIN ASPART 2 UNITS: 100 INJECTION, SOLUTION INTRAVENOUS; SUBCUTANEOUS at 18:06

## 2021-12-08 RX ADMIN — AZITHROMYCIN MONOHYDRATE 500 MG: 500 INJECTION, POWDER, LYOPHILIZED, FOR SOLUTION INTRAVENOUS at 13:54

## 2021-12-08 RX ADMIN — ROPINIROLE HYDROCHLORIDE 2 MG: 2 TABLET, FILM COATED ORAL at 22:23

## 2021-12-08 RX ADMIN — CEFTRIAXONE 2 G: 2 INJECTION, POWDER, FOR SOLUTION INTRAMUSCULAR; INTRAVENOUS at 13:00

## 2021-12-08 RX ADMIN — BUPROPION HYDROCHLORIDE 150 MG: 150 TABLET, EXTENDED RELEASE ORAL at 23:32

## 2021-12-08 RX ADMIN — DEXAMETHASONE SODIUM PHOSPHATE 10 MG: 10 INJECTION INTRAMUSCULAR; INTRAVENOUS at 12:02

## 2021-12-08 RX ADMIN — ENOXAPARIN SODIUM 40 MG: 40 INJECTION SUBCUTANEOUS at 16:30

## 2021-12-08 RX ADMIN — IOPAMIDOL 80 ML: 755 INJECTION, SOLUTION INTRAVENOUS at 11:21

## 2021-12-08 RX ADMIN — DULOXETINE HYDROCHLORIDE 60 MG: 60 CAPSULE, DELAYED RELEASE ORAL at 23:33

## 2021-12-08 ASSESSMENT — ENCOUNTER SYMPTOMS
VOMITING: 0
SHORTNESS OF BREATH: 1
FEVER: 1
ABDOMINAL PAIN: 0
COUGH: 1
CHILLS: 1
DYSURIA: 0
DIARRHEA: 0
CONFUSION: 0

## 2021-12-08 ASSESSMENT — ACTIVITIES OF DAILY LIVING (ADL)
ADLS_ACUITY_SCORE: 12
ADLS_ACUITY_SCORE: 4
ADLS_ACUITY_SCORE: 4
ADLS_ACUITY_SCORE: 12
ADLS_ACUITY_SCORE: 12
ADLS_ACUITY_SCORE: 4
ADLS_ACUITY_SCORE: 4
ADLS_ACUITY_SCORE: 12
ADLS_ACUITY_SCORE: 4
ADLS_ACUITY_SCORE: 4

## 2021-12-08 ASSESSMENT — MIFFLIN-ST. JEOR
SCORE: 1480.88
SCORE: 1480.27

## 2021-12-08 NOTE — ED TRIAGE NOTES
A&O x4.  ABC's intact.      Pt arrives with c/o fever that started yesterday afternoon, cough, nasal drainage, & sore throat.  Was seen yesterday at [facility] which had a chest xray done for SOB, cough and fever.     RADIOLOGY - 12/07/2021 11:36 PM CST    EXAM: XR CHEST 2 VIEWS  LOCATION: [facility]  DATE/TIME: 12/7/2021 11:34 PM    INDICATION: Pneumonia  COMPARISON: 11/23/2021    IMPRESSION: Bilateral stimulator devices with leads extending into the neck again noted. Old right rib fractures status post ORIF. Normal heart size. Improved lung volumes. No CHF, new lobar consolidation, or effusions.

## 2021-12-08 NOTE — ED NOTES
[facility]  ED Nurse Handoff Report    Vi Gee is a 66 year old female   ED Chief complaint: No chief complaint on file.  . ED Diagnosis:   Final diagnoses:   Acute respiratory failure with hypoxia (H)   Pneumonia due to infectious organism, unspecified laterality, unspecified part of lung     Allergies:   Allergies   Allergen Reactions     Celecoxib      Clindamycin      Epinephrine        Code Status: Full Code  Activity level - Baseline/Home:  Independent. Activity Level - Current:   Assist X 1. Lift room needed: No. Bariatric: No   Needed: No   Isolation: No. Infection: Not Applicable.     Vital Signs:   Vitals:    12/08/21 1315 12/08/21 1330 12/08/21 1345 12/08/21 1400   BP: 102/57 99/57 (!) 145/81 117/79   Pulse: 78 78 82 84   Resp:       Temp:    99.5  F (37.5  C)   TempSrc:    Oral   SpO2: 95% 96% 97% 94%   Weight:       Height:           Cardiac Rhythm:  ,      Pain level:    Patient confused: No. Patient Falls Risk: Yes.   Elimination Status: Has voided   Patient Report - Initial Complaint: Sore throat, coughing . Focused Assessment:   10:54 Respiratory Respiratory - Respiratory WDL: .WDL except; all  Rhythm/Pattern, Respiratory: shortness of breath  Expansion/Accessory Muscles/Retractions: expansion symmetric; no retractions; no use of accessory muscles  Nailbeds: no discoloration  Mucous Membranes: intact; pink; moist  Cough Frequency: infrequent  Cough Type: dry; good  Cough And Deep Breathing: done independently per patient  Secretions Amount: scant  Secretions Color: yellow   Respiratory Secretions Assessment - Secretions Amount: scant  Secretions Color: yellow         Tests Performed: EKG, Labs, Imaging . Abnormal Results:   Labs Ordered and Resulted from Time of ED Arrival to Time of ED Departure   COMPREHENSIVE METABOLIC PANEL - Abnormal       Result Value    Sodium 137      Potassium 4.3      Chloride 102      Carbon Dioxide (CO2) 29      Anion Gap 6      Urea  Nitrogen 10      Creatinine 0.85      Calcium 8.8      Glucose 110 (*)     Alkaline Phosphatase 100      AST 18      ALT 9      Protein Total 8.0      Albumin 3.6      Bilirubin Total 0.5      GFR Estimate 71     ROUTINE UA WITH MICROSCOPIC REFLEX TO CULTURE - Abnormal    Color Urine Light Yellow      Appearance Urine Clear      Glucose Urine Negative      Bilirubin Urine Negative      Ketones Urine Negative      Specific Gravity Urine 1.014      Blood Urine Negative      pH Urine 5.5      Protein Albumin Urine Negative      Urobilinogen Urine Normal      Nitrite Urine Negative      Leukocyte Esterase Urine Negative      Mucus Urine Present (*)     RBC Urine <1      WBC Urine <1      Squamous Epithelials Urine <1     CBC WITH PLATELETS AND DIFFERENTIAL - Abnormal    WBC Count 12.6 (*)     RBC Count 4.57      Hemoglobin 13.8      Hematocrit 43.0      MCV 94      MCH 30.2      MCHC 32.1      RDW 13.3      Platelet Count 272      % Neutrophils 85      % Lymphocytes 6      % Monocytes 7      % Eosinophils 1      % Basophils 0      % Immature Granulocytes 1      NRBCs per 100 WBC 0      Absolute Neutrophils 10.6 (*)     Absolute Lymphocytes 0.8      Absolute Monocytes 0.9      Absolute Eosinophils 0.2      Absolute Basophils 0.0      Absolute Immature Granulocytes 0.1      Absolute NRBCs 0.0     LACTIC ACID WHOLE BLOOD - Normal    Lactic Acid 1.4     INFLUENZA A/B & SARS-COV2 PCR MULTIPLEX - Normal    Influenza A PCR Negative      Influenza B PCR Negative      SARS CoV2 PCR Negative     NT PROBNP INPATIENT - Normal    N terminal Pro BNP Inpatient 63     TROPONIN I - Normal    Troponin I High Sensitivity 4     BLOOD CULTURE   BLOOD CULTURE     CT Chest Pulmonary Embolism w Contrast   Final Result   IMPRESSION:    1. No evidence of pulmonary embolism.   2. Mild right basilar pulmonary opacities, likely atelectasis.   3. Hepatic steatosis.   4. 1 cm left adrenal nodule, indeterminate, could represent adrenal   adenoma. This  can be further evaluated with CT or MRI adrenal mass   protocol.      PRICILLA CASTRO MD            SYSTEM ID:  [id number]      XR Chest Port 1 View   Final Result   IMPRESSION: Single AP view of the chest was obtained. Right and left   chest wall implantable neurostimulator device. Cardiomediastinal   silhouette is within normal limits. No suspicious focal pulmonary   opacities. No significant pleural effusion or pneumothorax.   Postsurgical changes of the right rib.      PRICILLA CASTRO MD            SYSTEM ID:  [id number]        .   Treatments provided: See MAR   Family Comments: N/A   OBS brochure/video discussed/provided to patient:  No  ED Medications:   Medications   azithromycin 500 mg (ZITHROMAX) in 0.9% NaCl 250 mL intermittent infusion 500 mg (500 mg Intravenous New Bag 12/8/21 1354)   0.9% sodium chloride BOLUS (0 mLs Intravenous Stopped 12/8/21 1053)   acetaminophen (TYLENOL) tablet 1,000 mg (1,000 mg Oral Given 12/8/21 1050)   CT Scan Flush (98 mLs Intravenous Given 12/8/21 1122)   iopamidol (ISOVUE-370) solution 500 mL (80 mLs Intravenous Given 12/8/21 1121)   dexamethasone PF (DECADRON) injection 10 mg (10 mg Intravenous Given 12/8/21 1202)   cefTRIAXone (ROCEPHIN) 2 g vial to attach to  ml bag for ADULTS or NS 50 ml bag for PEDS (0 g Intravenous Stopped 12/8/21 1406)     Drips infusing:  Yes  For the majority of the shift, the patient's behavior Green. Interventions performed were N/A.    Sepsis treatment initiated: No     Patient tested for COVID 19 prior to admission: YES    ED Nurse Name/Phone Number: Shelli Gilliam RN,   2:06 PM    RECEIVING UNIT ED HANDOFF REVIEW    Above ED Nurse Handoff Report was reviewed: Yes  Reviewed by: Shraddha Cheng RN on December 8, 2021 at 5:08 PM

## 2021-12-08 NOTE — ED PROVIDER NOTES
History     Chief Complaint:  No chief complaint on file.       HPI   Vi Gee is a 66 year old female who presents for evaluation of respiratory infectious symptoms.  The symptoms have actually been ongoing for last few weeks, however, have been worsening.  She has a cough that has been significantly worsening.  She has had fevers and chills.  She has a sore throat.  She has a sick contact and her  who also has similar symptoms.  They have not tested positive for Covid.  She has been vaccinated and received a booster vaccine.  She has no vomiting or diarrhea.  No abdominal pain.  She does have a history of COPD but has not been utilizing inhaler nebulizer at home.  She is not wearing oxygen.  She has not noticed any wheezing.    ROS:  Review of Systems   Constitutional: Positive for chills and fever.   Respiratory: Positive for cough and shortness of breath.    Cardiovascular: Negative for chest pain.   Gastrointestinal: Negative for abdominal pain, diarrhea and vomiting.   Genitourinary: Negative for dysuria.   Psychiatric/Behavioral: Negative for confusion.   All other systems reviewed and are negative.       Allergies:  Celecoxib  Clindamycin  Epinephrine     Medications:    No current outpatient medications on file.      Past Medical History:    No past medical history on file.  Patient Active Problem List   Diagnosis     Acute respiratory failure with hypoxia (H)     Pneumonia due to infectious organism, unspecified laterality, unspecified part of lung        Past Surgical History:    No past surgical history on file.     Family History:    family history is not on file.    Social History:   Arrives alone. .  PCP: Kristina Lockwood     Physical Exam     Patient Vitals for the past 24 hrs:   BP Temp Temp src Pulse Resp SpO2 Height Weight   12/08/21 1245 104/55 -- -- 79 -- 95 % -- --   12/08/21 1230 120/71 -- -- 79 -- 95 % -- --   12/08/21 1215 106/56 -- -- 82 -- 95 % -- --   12/08/21 1200  "106/62 -- -- 83 -- 96 % -- --   12/08/21 1115 106/58 -- -- 95 -- 95 % -- --   12/08/21 1100 125/65 -- -- 96 -- 96 % -- --   12/08/21 1059 -- -- -- -- -- 96 % -- --   12/08/21 1057 -- -- -- -- -- 96 % -- --   12/08/21 1056 133/69 -- -- 101 -- 94 % -- --   12/08/21 1051 -- -- -- -- -- (!) 86 % -- --   12/08/21 1045 -- -- -- -- -- (!) 88 % -- --   12/08/21 1030 -- -- -- -- -- 90 % -- --   12/08/21 1015 -- -- -- -- -- 90 % -- --   12/08/21 1000 -- -- -- -- -- 90 % -- --   12/08/21 0922 (!) 140/72 (!) 101.4  F (38.6  C) Oral 102 20 95 % 1.651 m (5' 5\") 94.5 kg (208 lb 5.4 oz)        Physical Exam  Constitutional: Well appearing.  HEENT: Atraumatic.    Moist mucous membranes.  Neck: Soft.  Supple.    Cardiac: Regular rate and rhythm.  No murmur or rub.  Respiratory: Clear to auscultation bilaterally.  No respiratory distress.  No wheezing, rhonchi, or rales.  Abdomen: Soft and nontender.  No rebound or guarding.  Nondistended.  Musculoskeletal: No edema.  Normal range of motion.  Neurologic: Alert and oriented x3.  Normal tone and bulk.   Skin: No rashes.  No edema.  Psych: Normal affect.  Normal behavior.      Emergency Department Course         Imaging:  CT Chest Pulmonary Embolism w Contrast   Final Result   IMPRESSION:    1. No evidence of pulmonary embolism.   2. Mild right basilar pulmonary opacities, likely atelectasis.   3. Hepatic steatosis.   4. 1 cm left adrenal nodule, indeterminate, could represent adrenal   adenoma. This can be further evaluated with CT or MRI adrenal mass   protocol.      PRICILLA CASTRO MD            SYSTEM ID:  [id number]      XR Chest Port 1 View   Final Result   IMPRESSION: Single AP view of the chest was obtained. Right and left   chest wall implantable neurostimulator device. Cardiomediastinal   silhouette is within normal limits. No suspicious focal pulmonary   opacities. No significant pleural effusion or pneumothorax.   Postsurgical changes of the right rib.      AHMAD " MD GLORIA            SYSTEM ID:  [id number]         Report per radiology    Laboratory:  Labs Ordered and Resulted from Time of ED Arrival to Time of ED Departure   COMPREHENSIVE METABOLIC PANEL - Abnormal       Result Value    Sodium 137      Potassium 4.3      Chloride 102      Carbon Dioxide (CO2) 29      Anion Gap 6      Urea Nitrogen 10      Creatinine 0.85      Calcium 8.8      Glucose 110 (*)     Alkaline Phosphatase 100      AST 18      ALT 9      Protein Total 8.0      Albumin 3.6      Bilirubin Total 0.5      GFR Estimate 71     ROUTINE UA WITH MICROSCOPIC REFLEX TO CULTURE - Abnormal    Color Urine Light Yellow      Appearance Urine Clear      Glucose Urine Negative      Bilirubin Urine Negative      Ketones Urine Negative      Specific Gravity Urine 1.014      Blood Urine Negative      pH Urine 5.5      Protein Albumin Urine Negative      Urobilinogen Urine Normal      Nitrite Urine Negative      Leukocyte Esterase Urine Negative      Mucus Urine Present (*)     RBC Urine <1      WBC Urine <1      Squamous Epithelials Urine <1     CBC WITH PLATELETS AND DIFFERENTIAL - Abnormal    WBC Count 12.6 (*)     RBC Count 4.57      Hemoglobin 13.8      Hematocrit 43.0      MCV 94      MCH 30.2      MCHC 32.1      RDW 13.3      Platelet Count 272      % Neutrophils 85      % Lymphocytes 6      % Monocytes 7      % Eosinophils 1      % Basophils 0      % Immature Granulocytes 1      NRBCs per 100 WBC 0      Absolute Neutrophils 10.6 (*)     Absolute Lymphocytes 0.8      Absolute Monocytes 0.9      Absolute Eosinophils 0.2      Absolute Basophils 0.0      Absolute Immature Granulocytes 0.1      Absolute NRBCs 0.0     LACTIC ACID WHOLE BLOOD - Normal    Lactic Acid 1.4     INFLUENZA A/B & SARS-COV2 PCR MULTIPLEX - Normal    Influenza A PCR Negative      Influenza B PCR Negative      SARS CoV2 PCR Negative     NT PROBNP INPATIENT - Normal    N terminal Pro BNP Inpatient 63     TROPONIN I - Normal    Troponin I High  Sensitivity 4     BLOOD CULTURE   BLOOD CULTURE        Procedures       Emergency Department Course:  Reviewed:  I reviewed nursing notes, vitals, past medical history and Care Everywhere    Consults:       Interventions:  Medications   azithromycin 500 mg (ZITHROMAX) in 0.9% NaCl 250 mL intermittent infusion 500 mg (500 mg Intravenous New Bag 12/8/21 1354)   0.9% sodium chloride BOLUS (0 mLs Intravenous Stopped 12/8/21 1053)   acetaminophen (TYLENOL) tablet 1,000 mg (1,000 mg Oral Given 12/8/21 1050)   CT Scan Flush (98 mLs Intravenous Given 12/8/21 1122)   iopamidol (ISOVUE-370) solution 500 mL (80 mLs Intravenous Given 12/8/21 1121)   dexamethasone PF (DECADRON) injection 10 mg (10 mg Intravenous Given 12/8/21 1202)   cefTRIAXone (ROCEPHIN) 2 g vial to attach to  ml bag for ADULTS or NS 50 ml bag for PEDS (2 g Intravenous New Bag 12/8/21 1300)       Disposition:  The patient was admitted to the hospital under the care of Dr. Gaona.     Impression & Plan        Covid-19  Vi Gee was evaluated during a global COVID-19 pandemic, which necessitated consideration that the patient might be at risk for infection with the SARS-CoV-2 virus that causes COVID-19.   Applicable protocols for evaluation were followed during the patient's care.   COVID-19 was considered as part of the patient's evaluation. The plan for testing is:  a test was obtained during this visit.    Medical Decision Making:  Vi Gee is a 66-year-old woman who is febrile, mildly tachycardic, and hypoxic. Broad work-up was initiated. She has a minimal leukocytosis. Her chest x-ray is actually clear with no obvious acute disease. Covid and influenza testing are negative. Given length of symptoms with her hypoxia, CT scan of the chest with PE protocol was obtained and revealed no evidence of PE, however, potential atelectasis versus early infiltrate in the right lower lobe. Given her leukocytosis and fever and hypoxia, I treated her  for potential bacterial pneumonia. This could also be viral in nature. Given the global Covid pandemic, I gave her Decadron as well. She is requiring 2 L of nasal cannula oxygen which is new for her. I discussed my recommendation for admission and she is in agreement. I spoke with hospitalist service accepts the patient to the medical floor and she is in stable condition awaiting transport to the floor    Diagnosis:    ICD-10-CM    1. Acute respiratory failure with hypoxia (H)  J96.01    2. Pneumonia due to infectious organism, unspecified laterality, unspecified part of lung  J18.9       12/8/2021   oJrge Bhatia MD Salay, Nicholas J, MD  12/08/21 1557

## 2021-12-08 NOTE — PLAN OF CARE
ROOM # 230    Living Situation (if not independent, order SW consult): Lives with   Facility name:  : [facility] () [phone]    Activity level at baseline: Walker   Activity level on admit: SBA with walker and gait belt       Patient registered to observation; given Patient Bill of Rights; given the opportunity to ask questions about observation status and their plan of care.  Patient has been oriented to the observation room, bathroom and call light is in place.    Discussed discharge goals and expectations with patient/family.

## 2021-12-09 LAB
ANION GAP SERPL CALCULATED.3IONS-SCNC: 2 MMOL/L (ref 3–14)
BUN SERPL-MCNC: 18 MG/DL (ref 7–30)
CALCIUM SERPL-MCNC: 8.4 MG/DL (ref 8.5–10.1)
CHLORIDE BLD-SCNC: 110 MMOL/L (ref 94–109)
CO2 SERPL-SCNC: 30 MMOL/L (ref 20–32)
CREAT SERPL-MCNC: 0.73 MG/DL (ref 0.52–1.04)
ERYTHROCYTE [DISTWIDTH] IN BLOOD BY AUTOMATED COUNT: 13.2 % (ref 10–15)
GFR SERPL CREATININE-BSD FRML MDRD: 85 ML/MIN/1.73M2
GLUCOSE BLD-MCNC: 163 MG/DL (ref 70–99)
GLUCOSE BLDC GLUCOMTR-MCNC: 130 MG/DL (ref 70–99)
GLUCOSE BLDC GLUCOMTR-MCNC: 132 MG/DL (ref 70–99)
GLUCOSE BLDC GLUCOMTR-MCNC: 139 MG/DL (ref 70–99)
GLUCOSE BLDC GLUCOMTR-MCNC: 155 MG/DL (ref 70–99)
GLUCOSE BLDC GLUCOMTR-MCNC: 172 MG/DL (ref 70–99)
HCT VFR BLD AUTO: 43.5 % (ref 35–47)
HGB BLD-MCNC: 13.3 G/DL (ref 11.7–15.7)
MCH RBC QN AUTO: 29.2 PG (ref 26.5–33)
MCHC RBC AUTO-ENTMCNC: 30.6 G/DL (ref 31.5–36.5)
MCV RBC AUTO: 96 FL (ref 78–100)
PLATELET # BLD AUTO: 278 10E3/UL (ref 150–450)
POTASSIUM BLD-SCNC: 4.4 MMOL/L (ref 3.4–5.3)
RBC # BLD AUTO: 4.55 10E6/UL (ref 3.8–5.2)
SODIUM SERPL-SCNC: 142 MMOL/L (ref 133–144)
WBC # BLD AUTO: 10.5 10E3/UL (ref 4–11)

## 2021-12-09 PROCEDURE — 80048 BASIC METABOLIC PNL TOTAL CA: CPT | Performed by: INTERNAL MEDICINE

## 2021-12-09 PROCEDURE — 250N000013 HC RX MED GY IP 250 OP 250 PS 637: Performed by: INTERNAL MEDICINE

## 2021-12-09 PROCEDURE — 250N000011 HC RX IP 250 OP 636: Performed by: INTERNAL MEDICINE

## 2021-12-09 PROCEDURE — 85027 COMPLETE CBC AUTOMATED: CPT | Performed by: INTERNAL MEDICINE

## 2021-12-09 PROCEDURE — 258N000003 HC RX IP 258 OP 636: Performed by: INTERNAL MEDICINE

## 2021-12-09 PROCEDURE — 99233 SBSQ HOSP IP/OBS HIGH 50: CPT | Performed by: PHYSICIAN ASSISTANT

## 2021-12-09 PROCEDURE — 120N000004 HC R&B MS OVERFLOW

## 2021-12-09 PROCEDURE — 250N000013 HC RX MED GY IP 250 OP 250 PS 637: Performed by: PHYSICIAN ASSISTANT

## 2021-12-09 PROCEDURE — 36415 COLL VENOUS BLD VENIPUNCTURE: CPT | Performed by: INTERNAL MEDICINE

## 2021-12-09 RX ORDER — ROPINIROLE 1 MG/1
1 TABLET, FILM COATED ORAL 3 TIMES DAILY
Status: DISCONTINUED | OUTPATIENT
Start: 2021-12-09 | End: 2021-12-10

## 2021-12-09 RX ORDER — CARBIDOPA AND LEVODOPA 25; 100 MG/1; MG/1
1 TABLET ORAL 4 TIMES DAILY
Status: DISCONTINUED | OUTPATIENT
Start: 2021-12-09 | End: 2021-12-09

## 2021-12-09 RX ORDER — CARBIDOPA AND LEVODOPA 25; 100 MG/1; MG/1
1 TABLET ORAL
Status: DISCONTINUED | OUTPATIENT
Start: 2021-12-09 | End: 2021-12-12 | Stop reason: HOSPADM

## 2021-12-09 RX ORDER — CLONAZEPAM 1 MG/1
1 TABLET ORAL AT BEDTIME
Status: DISCONTINUED | OUTPATIENT
Start: 2021-12-09 | End: 2021-12-12 | Stop reason: HOSPADM

## 2021-12-09 RX ORDER — CARBIDOPA AND LEVODOPA 25; 100 MG/1; MG/1
2 TABLET ORAL DAILY
Status: DISCONTINUED | OUTPATIENT
Start: 2021-12-09 | End: 2021-12-12 | Stop reason: HOSPADM

## 2021-12-09 RX ORDER — ROPINIROLE 2 MG/1
2 TABLET, FILM COATED ORAL DAILY
Status: DISCONTINUED | OUTPATIENT
Start: 2021-12-09 | End: 2021-12-10

## 2021-12-09 RX ORDER — CLONAZEPAM 0.5 MG/1
0.5 TABLET ORAL EVERY MORNING
Status: DISCONTINUED | OUTPATIENT
Start: 2021-12-09 | End: 2021-12-12 | Stop reason: HOSPADM

## 2021-12-09 RX ORDER — BENZONATATE 100 MG/1
100 CAPSULE ORAL 3 TIMES DAILY PRN
Status: DISCONTINUED | OUTPATIENT
Start: 2021-12-09 | End: 2021-12-12 | Stop reason: HOSPADM

## 2021-12-09 RX ORDER — GUAIFENESIN/DEXTROMETHORPHAN 100-10MG/5
10 SYRUP ORAL EVERY 4 HOURS PRN
Status: DISCONTINUED | OUTPATIENT
Start: 2021-12-09 | End: 2021-12-12 | Stop reason: HOSPADM

## 2021-12-09 RX ORDER — CARBIDOPA AND LEVODOPA 25; 100 MG/1; MG/1
1 TABLET ORAL DAILY PRN
Status: DISCONTINUED | OUTPATIENT
Start: 2021-12-09 | End: 2021-12-12 | Stop reason: HOSPADM

## 2021-12-09 RX ADMIN — CARBIDOPA AND LEVODOPA 1 TABLET: 25; 100 TABLET ORAL at 20:02

## 2021-12-09 RX ADMIN — SODIUM CHLORIDE: 9 INJECTION, SOLUTION INTRAVENOUS at 07:07

## 2021-12-09 RX ADMIN — CARBIDOPA AND LEVODOPA 1 TABLET: 25; 100 TABLET ORAL at 10:30

## 2021-12-09 RX ADMIN — ROPINIROLE HYDROCHLORIDE 1 MG: 1 TABLET, FILM COATED ORAL at 16:32

## 2021-12-09 RX ADMIN — GABAPENTIN 300 MG: 300 CAPSULE ORAL at 10:28

## 2021-12-09 RX ADMIN — ENOXAPARIN SODIUM 40 MG: 40 INJECTION SUBCUTANEOUS at 16:29

## 2021-12-09 RX ADMIN — BUPROPION HYDROCHLORIDE 150 MG: 150 TABLET, EXTENDED RELEASE ORAL at 22:18

## 2021-12-09 RX ADMIN — Medication 1 HALF-TAB: at 22:18

## 2021-12-09 RX ADMIN — ROPINIROLE HYDROCHLORIDE 2 MG: 2 TABLET, FILM COATED ORAL at 22:18

## 2021-12-09 RX ADMIN — CEFTRIAXONE 2 G: 2 INJECTION, POWDER, FOR SOLUTION INTRAMUSCULAR; INTRAVENOUS at 10:28

## 2021-12-09 RX ADMIN — CARBIDOPA AND LEVODOPA 1 TABLET: 25; 100 TABLET ORAL at 13:30

## 2021-12-09 RX ADMIN — CLONAZEPAM 0.5 MG: 0.5 TABLET ORAL at 08:52

## 2021-12-09 RX ADMIN — Medication 1 HALF-TAB: at 16:31

## 2021-12-09 RX ADMIN — ROPINIROLE HYDROCHLORIDE 2 MG: 2 TABLET, FILM COATED ORAL at 16:28

## 2021-12-09 RX ADMIN — ROPINIROLE HYDROCHLORIDE 1 MG: 1 TABLET, FILM COATED ORAL at 13:32

## 2021-12-09 RX ADMIN — Medication 1 HALF-TAB: at 13:30

## 2021-12-09 RX ADMIN — Medication 1 HALF-TAB: at 10:31

## 2021-12-09 RX ADMIN — ROPINIROLE HYDROCHLORIDE 1 MG: 1 TABLET, FILM COATED ORAL at 22:19

## 2021-12-09 RX ADMIN — INSULIN ASPART 1 UNITS: 100 INJECTION, SOLUTION INTRAVENOUS; SUBCUTANEOUS at 13:32

## 2021-12-09 RX ADMIN — TRAZODONE HYDROCHLORIDE 150 MG: 100 TABLET ORAL at 22:18

## 2021-12-09 RX ADMIN — ROPINIROLE HYDROCHLORIDE 1 MG: 1 TABLET, FILM COATED ORAL at 13:30

## 2021-12-09 RX ADMIN — CARBIDOPA AND LEVODOPA 2 TABLET: 25; 100 TABLET ORAL at 10:27

## 2021-12-09 RX ADMIN — DULOXETINE HYDROCHLORIDE 60 MG: 60 CAPSULE, DELAYED RELEASE ORAL at 22:18

## 2021-12-09 RX ADMIN — Medication 1 HALF-TAB: at 20:02

## 2021-12-09 RX ADMIN — CLONAZEPAM 1 MG: 1 TABLET ORAL at 22:17

## 2021-12-09 RX ADMIN — ROPINIROLE HYDROCHLORIDE 2 MG: 2 TABLET, FILM COATED ORAL at 07:05

## 2021-12-09 RX ADMIN — AZITHROMYCIN MONOHYDRATE 250 MG: 250 TABLET ORAL at 08:52

## 2021-12-09 RX ADMIN — ROPINIROLE HYDROCHLORIDE 2 MG: 2 TABLET, FILM COATED ORAL at 10:27

## 2021-12-09 RX ADMIN — GABAPENTIN 900 MG: 300 CAPSULE ORAL at 22:17

## 2021-12-09 RX ADMIN — GABAPENTIN 300 MG: 300 CAPSULE ORAL at 16:28

## 2021-12-09 RX ADMIN — CARBIDOPA AND LEVODOPA 1 TABLET: 25; 100 TABLET ORAL at 22:18

## 2021-12-09 RX ADMIN — CARBIDOPA AND LEVODOPA 1 TABLET: 25; 100 TABLET ORAL at 16:28

## 2021-12-09 RX ADMIN — CARBIDOPA AND LEVODOPA 2 TABLET: 25; 100 TABLET ORAL at 07:05

## 2021-12-09 ASSESSMENT — ACTIVITIES OF DAILY LIVING (ADL)
ADLS_ACUITY_SCORE: 6
ADLS_ACUITY_SCORE: 4
ADLS_ACUITY_SCORE: 6
ADLS_ACUITY_SCORE: 4
ADLS_ACUITY_SCORE: 6

## 2021-12-09 ASSESSMENT — MIFFLIN-ST. JEOR: SCORE: 1502.95

## 2021-12-09 NOTE — PROGRESS NOTES
Cross Cover    Called for patient requesting home medications be resumed:  Duloxetine, bupropion, trazodone, and gabapentin.    All resumed at home doses with the exception of her night time gabapentin dose-at home takes `1500, I decreased it to 900

## 2021-12-09 NOTE — PHARMACY-ADMISSION MEDICATION HISTORY
Admission medication history interview status for this patient is complete. See [facility] admission navigator for allergy information, prior to admission medications and immunization status.     Medication history interview done, indicate source(s): Patient and John () @ [phone]  Medication history resources (including written lists, pill bottles, clinic record):None      Changes made to PTA medication list:  Added: gabapentin BID, polytrim opth drops, cymbalta  Changed: sinemet,   Reported as Not Taking: none  Removed: none    Actions taken by pharmacist (provider contacted, etc):None     Additional medication history information:None    Medication reconciliation/reorder completed by provider prior to medication history?  n   (Y/N)     For patients on insulin therapy:   Do you use sliding scale insulin based on blood sugars?   What is your pre-meal insulin coverage?    Do you typically eat three meals a day?   How many times do you check your blood glucose per day?   How many episodes of hypoglycemia do you typically have per month?   Do you have a Continuous Glucose Monitor (CGM)?      Prior to Admission medications    Medication Sig Last Dose Taking? Auth Provider   albuterol (PROVENTIL) (2.5 MG/3ML) 0.083% neb solution Take 1 vial by nebulization every 6 hours as needed for wheezing prn Yes Unknown, Entered By History   buPROPion (WELLBUTRIN XL) 150 MG 24 hr tablet Take 150 mg by mouth daily 12/7/2021 at hs Yes Unknown, Entered By History   carbidopa-levodopa (SINEMET)  MG tablet TAKE 2 TABLETS @7AM, TAKE 1 & 1/2 TABLETS @10AM, 1PM, 4PM, 7PM & 10PM 12/8/2021 at 1000 Yes Unknown, Entered By History   carbidopa-levodopa (SINEMET)  MG tablet Take 1 tablet by mouth daily as needed Take at 2am if patient needs it prn Yes Unknown, Entered By History   clonazePAM (KLONOPIN) 0.5 MG tablet Take 0.5 mg by mouth every morning 12/8/2021 at am Yes Unknown, Entered By History   clonazePAM (KLONOPIN) 0.5 MG  tablet Take 1 mg by mouth At Bedtime 12/7/2021 at Unknown time Yes Unknown, Entered By History   DULoxetine (CYMBALTA) 60 MG capsule Take 60 mg by mouth At Bedtime  12/7/2021 at Unknown time Yes Unknown, Entered By History   gabapentin (NEURONTIN) 300 MG capsule Take 1,500 mg by mouth At Bedtime 12/7/2021 at Unknown time Yes Unknown, Entered By History   gabapentin (NEURONTIN) 300 MG capsule Take 300 mg by mouth 2 times daily At 10am and 4pm  Yes Unknown, Entered By History   nystatin (MYCOSTATIN) 786004 UNIT/ML suspension Swish and spit 5-10 mLs in mouth 4 times daily Until clear, then repeat course as needed  Yes Unknown, Entered By History   OZEMPIC, 0.25 OR 0.5 MG/DOSE, 2 MG/1.5ML SOPN pen once a week Inject 0.25 mg subcutaneous weekly for 4 weeks, then 0.5 mg weekly after that Past Month at 0.25 Yes Unknown, Entered By History   rOPINIRole (REQUIP) 2 MG tablet TAKE 1 TABLET BY MOUTH AT 7AM, TAKE 1/2 TABLET AT 1PM, TAKE 1 TABLET AT 4PM, AND 1 TAB AT 10PM 12/8/2021 at am Yes Unknown, Entered By History   traZODone (DESYREL) 100 MG tablet Take 150 mg by mouth At Bedtime 12/7/2021 at Unknown time Yes Unknown, Entered By History   trimethoprim-polymyxin b (POLYTRIM) 16436-2.1 UNIT/ML-% ophthalmic solution Place 2 drops into both eyes 3 times daily  12/7/2021 at Unknown time Yes Unknown, Entered By History

## 2021-12-09 NOTE — CONSULTS
Care Management Note    Discharge Date: 12/10/2021       Discharge Disposition: Home    Discharge Services: None    Discharge DME: None    Discharge Transportation: family or friend will provide    Private pay costs discussed: Not applicable      Handoff Referral Completed: Yes    Additional Information:  Met with pt has she was admitted with pna >65 years old. Pt states she does not use oxygen at home. She is not open with any home care services currently. She lives at home with her , John who manages her medications for her. She has a 4ww with a seat that she uses when she goes outside the home. Pt did mention she could use grab bars in the bathroom but is unable to afford them. Pt request assistance with scheduling a follow up appointment with PCP. [facility] request made, pt knows to look on discharge instructions for follow up appointment.    Madonna Coley RN, BSN CTS  Care Coordinator  [facility]   [phone]

## 2021-12-09 NOTE — PROGRESS NOTES
[facility]  Hospitalist Progress Note  Ladonna Barnhart PA-C 12/09/2021    Reason for Stay (Diagnosis): Cough, fever, shortness of breath          Assessment and Plan:      Summary of Stay: Vi Gee is a vaccinated 66 year old female with history of Parkinsons disease with brain stimulator placement, DM II, HTN, anxiety and sleep apnea admitted on 12/8/2021 with cough, fever and shortness of breath. On admission she was febrile to 101.4 with heart rate 102, pressure 140/72 and initially was not hypoxic but did have oxygen level of 86% requiring 2 litres. WBC elevated to 12.6 but lactic acid normal. BMP and LFTs unremarkable. UA negative. Blood cultures drawn, Covid/influenza negative and CT PE study negative for PE but did have mild right basilar pulmonary opacities. She was started on Ceftriaxone and Azithromycin with IV fluids.     #Acute hypoxemic respiratory failure due to suspected early community-acquired pneumonia versus bronchitis   Complains of a few weeks of respiratory symptoms with cough, fever and shortness of breath. CT scan shows mild right basilar pulmonary opacities consistent with community acquired pneumonia, negative for PE. She is stable on 2 litres.  She was started on Azithromycin and Ceftriaxone.   -Continue ceftriaxone and azithromycin as started in the ER  -Supplemental oxygen, wean as tolerated  -Robitussin DM  -Encourage IS  -Blood cultures negative since 12/8       #History of Parkinson's disease with deep brain stimulator  -Continue Sinemet      #History of anxiety and depression  -Continue clonazepam, Wellbutrin, trazodone and Cymbalta      #DM II  Glucose 110 in ED, A1c 6.4 on 9/9  -medium resistance sliding scale  -Ozempic monthly    #Adrenal incidentaloma  -Outpatient follow-up.     #History of obstructive sleep apnea     #RLS  -On Ropinirole      DVT Prophylaxis: Enoxaparin (Lovenox) SQ  Code Status: Full Code  Discharge Dispo: Anticipate 2-3 more  "days          Interval History (Subjective):      Reports continued shortness of breath and cough. Did eat some breakfast with no vomiting. Has had some diarrhea.                  Physical Exam:      Last Vital Signs:  /67 (BP Location: Right arm)   Pulse 79   Temp 97.8  F (36.6  C) (Oral)   Resp 18   Ht 1.651 m (5' 5\")   Wt 96.7 kg (213 lb 3.2 oz)   SpO2 97%   BMI 35.48 kg/m      No intake or output data in the 24 hours ending 12/09/21 1215    Constitutional: Awake, alert, cooperative, tremulous   Respiratory: Clear to auscultation bilaterally, no crackles or wheezing   Cardiovascular: Regular rate and rhythm, normal S1 and S2, and no murmur noted   Abdomen: Normal bowel sounds, soft, non-distended, non-tender   Skin: No rashes, no cyanosis, dry to touch   Neuro: Alert and oriented x3, tremulous. Able to ambulate   Extremities: No edema, normal range of motion   Other(s):        All other systems: Negative          Medications:      All current medications were reviewed with changes reflected in problem list.         Data:      All new lab and imaging data was reviewed.   Labs:  Recent Labs   Lab 12/09/21  0810 12/09/21  0524   NA  --  142   POTASSIUM  --  4.4   CHLORIDE  --  110*   CO2  --  30   ANIONGAP  --  2*   * 163*   BUN  --  18   CR  --  0.73   GFRESTIMATED  --  85   ENMANUEL  --  8.4*     Recent Labs   Lab 12/09/21  0524   WBC 10.5   HGB 13.3   HCT 43.5   MCV 96         Imaging:   No results found for this or any previous visit (from the past 24 hour(s)).    "

## 2021-12-09 NOTE — PLAN OF CARE
"[id number]    Inpatient Progress Note:    BP (!) 167/72 (BP Location: Right arm)   Pulse 76   Temp 98.2  F (36.8  C) (Oral)   Resp 20   Ht 1.651 m (5' 5\")   Wt 96.7 kg (213 lb 3.2 oz)   SpO2 94%   BMI 35.48 kg/m       Pt alert and oriented with forgetfulness. Anxious at times. On droplet precautions. Afebrile. Lung sounds diminished. Denies sob at rest. Dyspnea with exertion. On 2L O2 per NC satting > 90%. Frequent cough producing small amounts of yellow phlegm. Denies pain. SBA with gait belt and walker. Tremors noted in hands d/t parkinson's. [place]. Tolerated regular diet. NS @ 75ml/hr. Has deep brain stimulator that needs to be turned off prior to tests or imaging. Remote on night stand. Plan: rocephin, start oral zithromax, wean O2 as able      Kita Murphy RN        "

## 2021-12-09 NOTE — PROVIDER NOTIFICATION
10:32 PM  Provider notified: x cover  Reason: Pt requesting order for PTA gabapentin (including HS dose), wellbutrin, trazadone, and cymbalta. Please order.  Order: Dr. Little ordered all meds.   No deformity or limitation of movement

## 2021-12-10 LAB
GLUCOSE BLDC GLUCOMTR-MCNC: 101 MG/DL (ref 70–99)
GLUCOSE BLDC GLUCOMTR-MCNC: 103 MG/DL (ref 70–99)
GLUCOSE BLDC GLUCOMTR-MCNC: 110 MG/DL (ref 70–99)
GLUCOSE BLDC GLUCOMTR-MCNC: 116 MG/DL (ref 70–99)
GLUCOSE BLDC GLUCOMTR-MCNC: 121 MG/DL (ref 70–99)
GLUCOSE BLDC GLUCOMTR-MCNC: 130 MG/DL (ref 70–99)
GLUCOSE BLDC GLUCOMTR-MCNC: 70 MG/DL (ref 70–99)
GLUCOSE BLDC GLUCOMTR-MCNC: 73 MG/DL (ref 70–99)
GLUCOSE BLDC GLUCOMTR-MCNC: 74 MG/DL (ref 70–99)

## 2021-12-10 PROCEDURE — 87493 C DIFF AMPLIFIED PROBE: CPT | Performed by: PHYSICIAN ASSISTANT

## 2021-12-10 PROCEDURE — 250N000013 HC RX MED GY IP 250 OP 250 PS 637: Performed by: INTERNAL MEDICINE

## 2021-12-10 PROCEDURE — 250N000013 HC RX MED GY IP 250 OP 250 PS 637: Performed by: PHYSICIAN ASSISTANT

## 2021-12-10 PROCEDURE — 99233 SBSQ HOSP IP/OBS HIGH 50: CPT | Performed by: PHYSICIAN ASSISTANT

## 2021-12-10 PROCEDURE — 120N000004 HC R&B MS OVERFLOW

## 2021-12-10 PROCEDURE — 250N000011 HC RX IP 250 OP 636: Performed by: INTERNAL MEDICINE

## 2021-12-10 RX ORDER — ROPINIROLE 2 MG/1
2 TABLET, FILM COATED ORAL 3 TIMES DAILY
Status: DISCONTINUED | OUTPATIENT
Start: 2021-12-10 | End: 2021-12-12 | Stop reason: HOSPADM

## 2021-12-10 RX ORDER — ROPINIROLE 1 MG/1
1 TABLET, FILM COATED ORAL EVERY 24 HOURS
Status: DISCONTINUED | OUTPATIENT
Start: 2021-12-10 | End: 2021-12-12 | Stop reason: HOSPADM

## 2021-12-10 RX ADMIN — TRAZODONE HYDROCHLORIDE 150 MG: 100 TABLET ORAL at 22:18

## 2021-12-10 RX ADMIN — Medication 1 HALF-TAB: at 20:41

## 2021-12-10 RX ADMIN — CARBIDOPA AND LEVODOPA 2 TABLET: 25; 100 TABLET ORAL at 06:57

## 2021-12-10 RX ADMIN — GABAPENTIN 900 MG: 300 CAPSULE ORAL at 22:17

## 2021-12-10 RX ADMIN — ROPINIROLE HYDROCHLORIDE 2 MG: 2 TABLET, FILM COATED ORAL at 22:17

## 2021-12-10 RX ADMIN — ROPINIROLE HYDROCHLORIDE 1 MG: 1 TABLET, FILM COATED ORAL at 13:04

## 2021-12-10 RX ADMIN — CLONAZEPAM 0.5 MG: 0.5 TABLET ORAL at 08:34

## 2021-12-10 RX ADMIN — CARBIDOPA AND LEVODOPA 1 TABLET: 25; 100 TABLET ORAL at 20:41

## 2021-12-10 RX ADMIN — BUPROPION HYDROCHLORIDE 150 MG: 150 TABLET, EXTENDED RELEASE ORAL at 22:18

## 2021-12-10 RX ADMIN — Medication 1 HALF-TAB: at 22:17

## 2021-12-10 RX ADMIN — Medication 1 HALF-TAB: at 13:04

## 2021-12-10 RX ADMIN — AMOXICILLIN AND CLAVULANATE POTASSIUM 1 TABLET: 875; 125 TABLET, FILM COATED ORAL at 20:41

## 2021-12-10 RX ADMIN — Medication 1 HALF-TAB: at 16:27

## 2021-12-10 RX ADMIN — GABAPENTIN 300 MG: 300 CAPSULE ORAL at 16:27

## 2021-12-10 RX ADMIN — DULOXETINE HYDROCHLORIDE 60 MG: 60 CAPSULE, DELAYED RELEASE ORAL at 22:17

## 2021-12-10 RX ADMIN — ROPINIROLE HYDROCHLORIDE 2 MG: 2 TABLET, FILM COATED ORAL at 16:27

## 2021-12-10 RX ADMIN — CEFTRIAXONE 2 G: 2 INJECTION, POWDER, FOR SOLUTION INTRAMUSCULAR; INTRAVENOUS at 10:14

## 2021-12-10 RX ADMIN — CLONAZEPAM 1 MG: 1 TABLET ORAL at 22:17

## 2021-12-10 RX ADMIN — CARBIDOPA AND LEVODOPA 1 TABLET: 25; 100 TABLET ORAL at 10:10

## 2021-12-10 RX ADMIN — ROPINIROLE HYDROCHLORIDE 2 MG: 2 TABLET, FILM COATED ORAL at 06:57

## 2021-12-10 RX ADMIN — CARBIDOPA AND LEVODOPA 1 TABLET: 25; 100 TABLET ORAL at 22:18

## 2021-12-10 RX ADMIN — ENOXAPARIN SODIUM 40 MG: 40 INJECTION SUBCUTANEOUS at 16:26

## 2021-12-10 RX ADMIN — Medication 1 HALF-TAB: at 10:10

## 2021-12-10 RX ADMIN — GABAPENTIN 300 MG: 300 CAPSULE ORAL at 10:10

## 2021-12-10 RX ADMIN — CARBIDOPA AND LEVODOPA 1 TABLET: 25; 100 TABLET ORAL at 13:04

## 2021-12-10 RX ADMIN — AZITHROMYCIN MONOHYDRATE 250 MG: 250 TABLET ORAL at 08:35

## 2021-12-10 RX ADMIN — CARBIDOPA AND LEVODOPA 1 TABLET: 25; 100 TABLET ORAL at 16:27

## 2021-12-10 ASSESSMENT — ACTIVITIES OF DAILY LIVING (ADL)
ADLS_ACUITY_SCORE: 6
ADLS_ACUITY_SCORE: 6
ADLS_ACUITY_SCORE: 5
ADLS_ACUITY_SCORE: 5
ADLS_ACUITY_SCORE: 6
ADLS_ACUITY_SCORE: 6
ADLS_ACUITY_SCORE: 5
ADLS_ACUITY_SCORE: 6
ADLS_ACUITY_SCORE: 5
ADLS_ACUITY_SCORE: 6
ADLS_ACUITY_SCORE: 6
ADLS_ACUITY_SCORE: 5
ADLS_ACUITY_SCORE: 6
ADLS_ACUITY_SCORE: 6
ADLS_ACUITY_SCORE: 5
ADLS_ACUITY_SCORE: 6
ADLS_ACUITY_SCORE: 5
ADLS_ACUITY_SCORE: 6

## 2021-12-10 ASSESSMENT — MIFFLIN-ST. JEOR: SCORE: 1487.53

## 2021-12-10 NOTE — PROGRESS NOTES
[facility]  Hospitalist Progress Note  Ladonna Barnhart PA-C 12/10/2021    Reason for Stay (Diagnosis): Cough, fever, shortness of breath and diarrhea         Assessment and Plan:      Summary of Stay: Vi Gee is a vaccinated 66 year old female with history of Parkinsons disease with brain stimulator placement, DM II, HTN, anxiety and sleep apnea admitted on 12/8/2021 with cough, fever and shortness of breath. On admission she was febrile to 101.4 with heart rate 102, pressure 140/72 and initially was not hypoxic but did have oxygen level of 86% requiring 2 litres. WBC elevated to 12.6 but lactic acid normal. BMP and LFTs unremarkable. UA negative. Blood cultures drawn, Covid/influenza negative and CT PE study negative for PE but did have mild right basilar pulmonary opacities. She was started on Ceftriaxone and Azithromycin with IV fluids with improvement of respiratory symptoms. On 12/10 she developed new watery diarrhea with abdominal pain.      #Acute hypoxemic respiratory failure due to suspected early community-acquired pneumonia versus bronchitis   Complains of a few weeks of respiratory symptoms with cough, fever and shortness of breath. CT scan shows mild right basilar pulmonary opacities consistent with community acquired pneumonia, negative for PE. She was started on Azithromycin and Ceftriaxone. She is stable on 2 litres which was weaned to room air on 12/10.    -Received Ceftriaxone 12/8-12/10 and will receive Azithromycin 12/8-12/12  -Loss IV on 12/10 so started Augmentin 875/125 BID  -Received max of 2 litres and now on room air  -Robitussin DM  -Encourage IS  -Blood cultures negative since 12/8    #Abdominal pain and diarrhea  New onset abdominal pain, poor appetite and watery diarrhea multiple times (interrupted our interview twice due to stool urgency)  -C diff test ordered but possibly related to antibiotics  -May need IV for fluids if she becomes  "dehydrated        #History of Parkinson's disease with deep brain stimulator  -Continue Sinemet      #History of anxiety and depression  -Continue clonazepam, Wellbutrin, trazodone and Cymbalta      #DM II  Glucose 110 in ED, A1c 6.4 on 9/9  -medium resistance sliding scale  -Ozempic monthly     #Adrenal incidentaloma  -Outpatient follow-up.     #History of obstructive sleep apnea     #RLS  -On Ropinirole        DVT Prophylaxis: Enoxaparin (Lovenox) SQ  Code Status: Full Code  Discharge Dispo: Anticipate 2-3 more days        Interval History (Subjective):      Breathing better today but has poor appetite with abdominal pain and new onset watery diarrhea.                   Physical Exam:      Last Vital Signs:  /87 (BP Location: Right arm)   Pulse 85   Temp 98.3  F (36.8  C) (Oral)   Resp 20   Ht 1.651 m (5' 5\")   Wt 95.2 kg (209 lb 12.8 oz)   SpO2 95%   BMI 34.91 kg/m        Intake/Output Summary (Last 24 hours) at 12/10/2021 1438  Last data filed at 12/10/2021 0800  Gross per 24 hour   Intake 120 ml   Output --   Net 120 ml       Constitutional: Awake, alert, cooperative, no apparent distress   Respiratory: Clear to auscultation bilaterally, no crackles or wheezing   Cardiovascular: Regular rate and rhythm, normal S1 and S2, and no murmur noted   Abdomen: Normal bowel sounds, soft, non-distended, non-tender   Skin: No rashes, no cyanosis, dry to touch   Neuro: Alert and oriented x3, no weakness, numbness, memory loss   Extremities: No edema, normal range of motion   Other(s):        All other systems: Negative          Medications:      All current medications were reviewed with changes reflected in problem list.         Data:      All new lab and imaging data was reviewed.   Labs:  Recent Labs   Lab 12/10/21  1357 12/09/21  0810 12/09/21  0524   NA  --   --  142   POTASSIUM  --   --  4.4   CHLORIDE  --   --  110*   CO2  --   --  30   ANIONGAP  --   --  2*   GLC 70   < > 163*   BUN  --   --  18   CR  " --   --  0.73   GFRESTIMATED  --   --  85   ENMANUEL  --   --  8.4*    < > = values in this interval not displayed.     Recent Labs   Lab 12/09/21  0524   WBC 10.5   HGB 13.3   HCT 43.5   MCV 96         Imaging:   No results found for this or any previous visit (from the past 24 hour(s)).

## 2021-12-10 NOTE — PLAN OF CARE
Pt ambulated in the hallway Ax1 with gait belt and pulse ox with NST. SpO2 94%-97% at room air. Pt denies dizziness, SOB.Pt up in chair comfortably.

## 2021-12-10 NOTE — PLAN OF CARE
"/87 (BP Location: Right arm)   Pulse 85   Temp 98.3  F (36.8  C) (Oral)   Resp 20   Ht 1.651 m (5' 5\")   Wt 95.2 kg (209 lb 12.8 oz)   SpO2 95%   BMI 34.91 kg/m        Neuro: alert and oriented x4. Forgetful. Tremor in the UE worsen with extremity movement due to Parkinson's disease. Pt have deep brain stimulator and on Sinemet.     Cardiac: wdl  Lungs: frequent nonproductive cough. Clear LS,  O2 weaned down to room with sat >94%.        GI: wdl  : wdl  Pain: denies  IV: Rocephin Q24 hrs for pneumonia. Lost the PIV, need new PIV pt refused the BS nurse to place one.    Labs/Imaging: BC no growth >24 hrs. COVID, Influenza A&B negative.   Diet: regular.   Activity: stand by assist with a walker and galt belt.  Precaution: droplet precaution for community acquired pneumonia.     BG: Low Blood glucose noted, treated per protocol and No insulin given   Plan: continue Rocephin and Zithromax. Continue monitoring on pulse-ox. Wean as able. Encourage IS use. BG ACHS.      Will continue to monitor and provide supportive care.     "

## 2021-12-10 NOTE — PLAN OF CARE
"[id number]    Inpatient Progress Note:    /61 (BP Location: Right arm)   Pulse 71   Temp 98.2  F (36.8  C) (Oral)   Resp 20   Ht 1.651 m (5' 5\")   Wt 96.7 kg (213 lb 3.2 oz)   SpO2 93%   BMI 35.48 kg/m       Pt alert and oriented with forgetfulness. Can be disoriented during sleep. On droplet precautions. Afebrile. Lung sounds diminished. Dyspnea with exertion. Was able to wean down to 1L O2 for a short time. Pt's SpO2 88% during sleep with 1L O2 and required 2L O2  satting > 90%. Infrequent cough producing small amounts of yellow/white phlegm. Denies pain. SBA with gait belt and walker. Tremors noted in hands d/t parkinson's. [place]. Tolerated regular diet. PIV SL in between abx. Has deep brain stimulator that needs to be turned off prior to tests or imaging. Remote on night stand. Plan: rocephin, oral zithromax, wean O2 as able.      Kita Murphy RN        "

## 2021-12-10 NOTE — PROGRESS NOTES
Pt ambulated in the hallway Ax1 with gait belt and pulse ox. SpO2 94%-97% at room air. Pt denies dizziness, SOB.Pt up in chair for breakfast.RN notified

## 2021-12-10 NOTE — PROGRESS NOTES
"Vitals: /75 (BP Location: Right arm)   Pulse 75   Temp 98.3  F (36.8  C) (Oral)   Resp 18   Ht 1.651 m (5' 5\")   Wt 96.7 kg (213 lb 3.2 oz)   SpO2 98%   BMI 35.48 kg/m       [id number]    Neuro: alert and oriented x4. Forgetful. Tremor in the UE worsen with extremity movement due to Parkinson's disease. Pt have deep brain stimulator and on Sinemet.     Cardiac: wdl  Lungs: frequent nonproductive cough. [place]. On 2L O2 with sat >94% at rest but needed more O2 when getting out of bed and talking for prolonged time. Robitussin available for cough but pt said they can't take it due to it contraindicates with [place]. Pharmacy updated but unable to conform the information. Pt reported haven't had a cough medicine in the past and didn't want any cough medicine unless it's verified by pharmacy it's safe to take with Sinemet.       GI: wdl  : wdl  Pain: denies  IV: Rocephin Q24 hrs for pneumonia. Saline locked otherwise.   Labs/Imaging: BC no growth >24 hrs. COVID, Influenza A&B negative.   Diet: regular.   Activity: stand by assist with a walker and galt belt.  Precaution: droplet precaution for community acquired pneumonia.   Plan: continue Rocephin and Zithromax. Continue monitoring on pulse-ox. Wean as able. Encourage IS use. BG ACHS.     Will continue to monitor and provide supportive care.       "

## 2021-12-11 LAB
C DIFF TOX B STL QL: NEGATIVE
ERYTHROCYTE [DISTWIDTH] IN BLOOD BY AUTOMATED COUNT: 13.2 % (ref 10–15)
GLUCOSE BLDC GLUCOMTR-MCNC: 119 MG/DL (ref 70–99)
GLUCOSE BLDC GLUCOMTR-MCNC: 122 MG/DL (ref 70–99)
GLUCOSE BLDC GLUCOMTR-MCNC: 183 MG/DL (ref 70–99)
GLUCOSE BLDC GLUCOMTR-MCNC: 91 MG/DL (ref 70–99)
GLUCOSE BLDC GLUCOMTR-MCNC: 96 MG/DL (ref 70–99)
HCT VFR BLD AUTO: 40.9 % (ref 35–47)
HGB BLD-MCNC: 13.5 G/DL (ref 11.7–15.7)
MCH RBC QN AUTO: 30.5 PG (ref 26.5–33)
MCHC RBC AUTO-ENTMCNC: 33 G/DL (ref 31.5–36.5)
MCV RBC AUTO: 93 FL (ref 78–100)
PLATELET # BLD AUTO: 252 10E3/UL (ref 150–450)
RBC # BLD AUTO: 4.42 10E6/UL (ref 3.8–5.2)
WBC # BLD AUTO: 6.8 10E3/UL (ref 4–11)

## 2021-12-11 PROCEDURE — 250N000013 HC RX MED GY IP 250 OP 250 PS 637: Performed by: INTERNAL MEDICINE

## 2021-12-11 PROCEDURE — 250N000013 HC RX MED GY IP 250 OP 250 PS 637: Performed by: PHYSICIAN ASSISTANT

## 2021-12-11 PROCEDURE — 250N000011 HC RX IP 250 OP 636: Performed by: INTERNAL MEDICINE

## 2021-12-11 PROCEDURE — 99233 SBSQ HOSP IP/OBS HIGH 50: CPT | Performed by: PHYSICIAN ASSISTANT

## 2021-12-11 PROCEDURE — 36415 COLL VENOUS BLD VENIPUNCTURE: CPT | Performed by: PHYSICIAN ASSISTANT

## 2021-12-11 PROCEDURE — 120N000004 HC R&B MS OVERFLOW

## 2021-12-11 PROCEDURE — 85027 COMPLETE CBC AUTOMATED: CPT | Performed by: PHYSICIAN ASSISTANT

## 2021-12-11 RX ORDER — LACTOBACILLUS RHAMNOSUS GG 10B CELL
1 CAPSULE ORAL 2 TIMES DAILY
Status: DISCONTINUED | OUTPATIENT
Start: 2021-12-11 | End: 2021-12-12 | Stop reason: HOSPADM

## 2021-12-11 RX ADMIN — ROPINIROLE HYDROCHLORIDE 2 MG: 2 TABLET, FILM COATED ORAL at 06:45

## 2021-12-11 RX ADMIN — Medication 1 HALF-TAB: at 20:29

## 2021-12-11 RX ADMIN — ACETAMINOPHEN 650 MG: 325 TABLET, FILM COATED ORAL at 06:51

## 2021-12-11 RX ADMIN — Medication 1 HALF-TAB: at 09:06

## 2021-12-11 RX ADMIN — Medication 1 CAPSULE: at 20:29

## 2021-12-11 RX ADMIN — Medication 1 HALF-TAB: at 16:19

## 2021-12-11 RX ADMIN — Medication 1 HALF-TAB: at 12:53

## 2021-12-11 RX ADMIN — BUPROPION HYDROCHLORIDE 150 MG: 150 TABLET, EXTENDED RELEASE ORAL at 22:13

## 2021-12-11 RX ADMIN — GABAPENTIN 300 MG: 300 CAPSULE ORAL at 09:07

## 2021-12-11 RX ADMIN — Medication 1 HALF-TAB: at 22:13

## 2021-12-11 RX ADMIN — GABAPENTIN 900 MG: 300 CAPSULE ORAL at 22:13

## 2021-12-11 RX ADMIN — AZITHROMYCIN MONOHYDRATE 250 MG: 250 TABLET ORAL at 09:05

## 2021-12-11 RX ADMIN — ROPINIROLE HYDROCHLORIDE 2 MG: 2 TABLET, FILM COATED ORAL at 22:13

## 2021-12-11 RX ADMIN — TRAZODONE HYDROCHLORIDE 150 MG: 100 TABLET ORAL at 22:12

## 2021-12-11 RX ADMIN — ENOXAPARIN SODIUM 40 MG: 40 INJECTION SUBCUTANEOUS at 16:19

## 2021-12-11 RX ADMIN — AMOXICILLIN AND CLAVULANATE POTASSIUM 1 TABLET: 875; 125 TABLET, FILM COATED ORAL at 09:05

## 2021-12-11 RX ADMIN — CARBIDOPA AND LEVODOPA 1 TABLET: 25; 100 TABLET ORAL at 16:19

## 2021-12-11 RX ADMIN — AMOXICILLIN AND CLAVULANATE POTASSIUM 1 TABLET: 875; 125 TABLET, FILM COATED ORAL at 20:30

## 2021-12-11 RX ADMIN — CARBIDOPA AND LEVODOPA 2 TABLET: 25; 100 TABLET ORAL at 06:44

## 2021-12-11 RX ADMIN — CARBIDOPA AND LEVODOPA 1 TABLET: 25; 100 TABLET ORAL at 22:13

## 2021-12-11 RX ADMIN — CLONAZEPAM 0.5 MG: 0.5 TABLET ORAL at 09:07

## 2021-12-11 RX ADMIN — CARBIDOPA AND LEVODOPA 1 TABLET: 25; 100 TABLET ORAL at 12:53

## 2021-12-11 RX ADMIN — GABAPENTIN 300 MG: 300 CAPSULE ORAL at 16:19

## 2021-12-11 RX ADMIN — CARBIDOPA AND LEVODOPA 1 TABLET: 25; 100 TABLET ORAL at 20:29

## 2021-12-11 RX ADMIN — DULOXETINE HYDROCHLORIDE 60 MG: 60 CAPSULE, DELAYED RELEASE ORAL at 22:13

## 2021-12-11 RX ADMIN — CLONAZEPAM 1 MG: 1 TABLET ORAL at 22:13

## 2021-12-11 RX ADMIN — ROPINIROLE HYDROCHLORIDE 2 MG: 2 TABLET, FILM COATED ORAL at 16:19

## 2021-12-11 RX ADMIN — CARBIDOPA AND LEVODOPA 1 TABLET: 25; 100 TABLET ORAL at 09:06

## 2021-12-11 RX ADMIN — ROPINIROLE HYDROCHLORIDE 1 MG: 1 TABLET, FILM COATED ORAL at 12:53

## 2021-12-11 ASSESSMENT — ACTIVITIES OF DAILY LIVING (ADL)
ADLS_ACUITY_SCORE: 5
ADLS_ACUITY_SCORE: 6
ADLS_ACUITY_SCORE: 5
ADLS_ACUITY_SCORE: 6
ADLS_ACUITY_SCORE: 5
ADLS_ACUITY_SCORE: 5
ADLS_ACUITY_SCORE: 6

## 2021-12-11 NOTE — PLAN OF CARE
Patient alert/oriented x4. Denies pain/discomfort. Lung sounds diminished throughout. Regular heart rhythm. Abdomen is obese and non-tender. Voiding without difficulty. No skin concerns. Continue Augmentin and azithromycin. Wean oxygen.

## 2021-12-11 NOTE — PLAN OF CARE
Temp: 98.1  F (36.7  C) Temp src: Oral BP: 101/50 Pulse: 69   Resp: 20 SpO2: 99 % O2 Device: Nasal cannula Oxygen Delivery: 2 LPM     A&O. Up SBA. Denies pain. Still having episodes of diarrhea- cdiff negative. Cough less frequent today and not as wheezy today. Plan- po augmentin every 12 hours and azithromycin daily, wean O2 if able- O2 needs increase at night, started probiotic today.

## 2021-12-11 NOTE — PLAN OF CARE
"/70 (BP Location: Right arm)   Pulse 76   Temp 98.7  F (37.1  C) (Oral)   Resp 20   Ht 1.651 m (5' 5\")   Wt 95.2 kg (209 lb 12.8 oz)   SpO2 94%   BMI 34.91 kg/m       Neuro: WDL  Cardiac: WDL  Lungs: Diminished, coarse on right. Productive coughs. 1 L NC.  GI: Diarrhea x2. No nausea.  : WDL  Pain: Denies  IV: No IV access  Meds: oral Augmentin for pna  Labs/tests: C. Diff Negative.  Diet: Regular  Activity: SBA  Plan: Continue abx, supportive cares and symptom management. Wean O2 as able.     "

## 2021-12-11 NOTE — PROGRESS NOTES
[facility]    Hospitalist Progress Note  Name: Vi Gee    MRN: [id number]  Provider:  Debra Walker PA-C  Date of Service: 12/11/2021    Assessment & Plan   Summary of Stay: Vi Gee is a vaccinated 66 year old female with history of Parkinsons disease with deep brain stimulator, DM II, HTN, anxiety, and sleep apnea admitted on 12/8/2021 with cough, fever and shortness of breath. On admission she was febrile to 101.4 with heart rate 102, pressure 140/72 and hypoxic down to 86% on RA with improvement on 2 liters. WBC elevated to 12.6 but lactic acid normal. BMP and LFTs unremarkable. UA negative. Blood cultures drawn, Covid/influenza negative and CT PE study negative for PE but did have mild right basilar pulmonary opacities. She was started on Ceftriaxone and Azithromycin with IV fluids with improvement of respiratory symptoms. On 12/10 she developed new watery diarrhea with abdominal pain.     #Acute hypoxemic respiratory failure due to suspected early community-acquired pneumonia versus bronchitis   Complains of a few weeks of respiratory symptoms with cough, fever and shortness of breath. CT scan on admission shows mild right basilar pulmonary opacities consistent with community acquired pneumonia, negative for PE. She was started on Azithromycin and Ceftriaxone. She was stable on 2 liters which was weaned to room air on 12/10 but did require supplemental oxygen again overnight, suspect due to h/o AMELIA.    -Received Ceftriaxone 12/8-12/10 and will receive Azithromycin 12/8-12/12  -Loss IV on 12/10 so started Augmentin 875/125 BID   intermittently requiring 1 liter of supplemental oxygen, appears to be mostly when sleeping, will monitor saturations while awake and wean again to RA  -Robitussin DM  -Encourage IS  -Blood cultures negative since 12/8     #Abdominal pain and diarrhea  New onset abdominal pain, poor appetite and watery diarrhea multiple times on 12/10, a couple episodes today  without associated pain but still watery.   -monitor I&Os  -C diff test negative on 12/10  -suspect related to antibiotics   -start probiotic  -tolerating some PO intake, monitor fluid intake and if able to keep up with oral hydration with ongoing diarrhea     #History of Parkinson's disease with deep brain stimulator  -Continue PTA Sinemet      #History of anxiety and depression  -Continue Clonazepam, Wellbutrin, Trazodone and Cymbalta      #DM II  Glucose 110 in ED, A1c 6.4 on 9/9  -medium resistance sliding scale  -Ozempic monthly     #Adrenal incidentaloma  -Outpatient follow-up.     #History of obstructive sleep apnea     #RLS  -On Ropinirole    COVID: tested negative for COVID on 12/8/21   DVT Prophylaxis: Enoxaparin (Lovenox) SQ  Code Status: Full Code  Disposition: Expected discharge in the next 24 hours if weaned off supplemental oxygen and tolerating adequate PO intake to keep up with diarrhea    Debra Walker [facility]    Interval History   Ongoing watery diarrhea almost every time she uses the bathroom. Denies associated nausea, vomiting, or diarrhea. She did require 1 liter of supplemental oxygen at night. She usually uses a CPAP at home that is not with her here.     -Data reviewed today: I reviewed all new labs and imaging reports over the last 24 hours.    Physical Exam   Temp: 98.4  F (36.9  C) Temp src: Oral BP: 125/74 Pulse: 78   Resp: 22 SpO2: 95 % O2 Device: Nasal cannula Oxygen Delivery: 1 LPM  Vitals:    12/08/21 1726 12/09/21 0404 12/10/21 0700   Weight: 94.4 kg (208 lb 3.2 oz) 96.7 kg (213 lb 3.2 oz) 95.2 kg (209 lb 12.8 oz)     Vital Signs with Ranges  Temp:  [98.3  F (36.8  C)-99  F (37.2  C)] 98.4  F (36.9  C)  Pulse:  [76-86] 78  Resp:  [20-22] 22  BP: (119-131)/(70-91) 125/74  SpO2:  [89 %-95 %] 95 %  I/O last 3 completed shifts:  In: 120 [P.O.:120]  Out: 400 [Stool:400]    GEN:  Alert, oriented x 3, appears comfortable, NAD.  HEENT:   Normocephalic/atraumatic, no scleral icterus, no nasal discharge, mouth moist.  CV:  Regular rate and rhythm, no murmur or JVD.  S1 + S2 noted, no S3 or S4.  LUNGS:  Clear to auscultation bilaterally without rales/rhonchi/wheezing/retractions.  Symmetric chest rise on inhalation noted.  ABD:  Active bowel sounds, soft, non-tender/non-distended.  No rebound/guarding/rigidity.  EXT:  No edema.  No cyanosis.  No acute joint synovitis noted.  SKIN:  Dry to touch, no exanthems noted in the visualized areas.    Medications       amoxicillin-clavulanate  1 tablet Oral Q12H ANGEL     azithromycin  250 mg Oral Daily     buPROPion  150 mg Oral At Bedtime     carbidopa-levodopa  1 tablet Oral 5x Daily    And     carbidopa-levodopa  1 half-tab Oral 5x Daily     carbidopa-levodopa  2 tablet Oral Daily     clonazePAM  0.5 mg Oral QAM     clonazePAM  1 mg Oral At Bedtime     DULoxetine  60 mg Oral At Bedtime     enoxaparin ANTICOAGULANT  40 mg Subcutaneous Q24H     gabapentin  300 mg Oral BID     gabapentin  900 mg Oral At Bedtime     insulin aspart  1-7 Units Subcutaneous TID AC     insulin aspart  1-5 Units Subcutaneous At Bedtime     rOPINIRole  1 mg Oral Q24H     rOPINIRole  2 mg Oral TID     sodium chloride (PF)  3 mL Intracatheter Q8H     traZODone  150 mg Oral At Bedtime     Data   Results for orders placed or performed during the hospital encounter of 12/08/21   XR Chest Port 1 View     Status: None    Narrative    CHEST ONE VIEW PORTABLE  12/8/2021 10:05 AM     HISTORY: Fever, cough.    COMPARISON: Chest x-ray on 7/30/2010      Impression    IMPRESSION: Single AP view of the chest was obtained. Right and left  chest wall implantable neurostimulator device. Cardiomediastinal  silhouette is within normal limits. No suspicious focal pulmonary  opacities. No significant pleural effusion or pneumothorax.  Postsurgical changes of the right rib.    PRICILLA CASTRO MD         SYSTEM ID:  [id number]   CT Chest Pulmonary Embolism w  Contrast     Status: None    Narrative    CT CHEST PULMONARY EMBOLISM WITH CONTRAST December 8, 2021 11:45 AM    HISTORY: Pulmonary embolus suspected, low/intermediate probability,  positive D-dimer. Cough unexplained, tachycardia, chest pain, evaluate  pulmonary embolism.    COMPARISON: Chest x-ray on same day earlier.    TECHNIQUE: Scans obtained from the apices through the diaphragm with  IV contrast. 80mL Isovue-370 IV injected. Radiation dose for this scan  was reduced using automated exposure control, adjustment of the mA  and/or kV according to patient size, or iterative reconstruction  technique.    FINDINGS:  Chest/mediastinum: No evidence of pulmonary embolism. Mild  cardiomegaly. No significant pericardial effusion. Multiple prominent  mediastinal and hilar lymph nodes, indeterminate, could be reactive.  Bilateral chest wall electronic implantable devices. Mild  atherosclerotic vascular calcification of the coronary arteries.    Lungs and pleura: No pleural effusion or pneumothorax. Bibasilar  pulmonary opacities, likely atelectasis. 10 mm nodular opacity along  the lung fissure (series 9 image 87), likely represent intrafissural  lymph node.    Upper abdomen: Limited evaluation of upper abdomen due to lack of  coverage and timing of contrast. Diffuse echogenicity of the liver,  likely due to underlying hepatic steatosis. Partially visualized  possibly left hepatic cyst 1 cm left adrenal nodule, indeterminate,  could represent adrenal adenoma.    Bones and soft tissue: Multilevel degenerative changes of the spine.  No suspicious osseous lesion. Postsurgical changes of the right ribs.      Impression    IMPRESSION:   1. No evidence of pulmonary embolism.  2. Mild right basilar pulmonary opacities, likely atelectasis.  3. Hepatic steatosis.  4. 1 cm left adrenal nodule, indeterminate, could represent adrenal  adenoma. This can be further evaluated with CT or MRI adrenal mass  protocol.    PRICILLA CASTRO,  MD         SYSTEM ID:  [id number]   Comprehensive metabolic panel     Status: Abnormal   Result Value Ref Range    Sodium 137 133 - 144 mmol/L    Potassium 4.3 3.4 - 5.3 mmol/L    Chloride 102 94 - 109 mmol/L    Carbon Dioxide (CO2) 29 20 - 32 mmol/L    Anion Gap 6 3 - 14 mmol/L    Urea Nitrogen 10 7 - 30 mg/dL    Creatinine 0.85 0.52 - 1.04 mg/dL    Calcium 8.8 8.5 - 10.1 mg/dL    Glucose 110 (H) 70 - 99 mg/dL    Alkaline Phosphatase 100 40 - 150 U/L    AST 18 0 - 45 U/L    ALT 9 0 - 50 U/L    Protein Total 8.0 6.8 - 8.8 g/dL    Albumin 3.6 3.4 - 5.0 g/dL    Bilirubin Total 0.5 0.2 - 1.3 mg/dL    GFR Estimate 71 >60 mL/min/1.73m2   Lactic acid whole blood     Status: Normal   Result Value Ref Range    Lactic Acid 1.4 0.7 - 2.0 mmol/L   UA with Microscopic reflex to Culture     Status: Abnormal    Specimen: Urine, Midstream   Result Value Ref Range    Color Urine Light Yellow Colorless, Straw, Light Yellow, Yellow    Appearance Urine Clear Clear    Glucose Urine Negative Negative mg/dL    Bilirubin Urine Negative Negative    Ketones Urine Negative Negative mg/dL    Specific Gravity Urine 1.014 1.003 - 1.035    Blood Urine Negative Negative    pH Urine 5.5 5.0 - 7.0    Protein Albumin Urine Negative Negative mg/dL    Urobilinogen Urine Normal Normal, 2.0 mg/dL    Nitrite Urine Negative Negative    Leukocyte Esterase Urine Negative Negative    Mucus Urine Present (A) None Seen /LPF    RBC Urine <1 <=2 /HPF    WBC Urine <1 <=5 /HPF    Squamous Epithelials Urine <1 <=1 /HPF    Narrative    Urine Culture not indicated   Symptomatic Influenza A/B & SARS-CoV2 (COVID-19) Virus PCR Multiplex Nasopharyngeal     Status: Normal    Specimen: Nasopharyngeal; Swab   Result Value Ref Range    Influenza A PCR Negative Negative    Influenza B PCR Negative Negative    SARS CoV2 PCR Negative Negative    Narrative    Testing was performed using the [employer]esa SARS-CoV-2 & Influenza A/B Assay on the [employer] Maryann System. This test should be  ordered for the detection of SARS-CoV-2 and influenza viruses in individuals who meet clinical and/or epidemiological criteria. Test performance is unknown in asymptomatic patients. This test is for in vitro diagnostic use under the FDA EUA for laboratories certified under CLIA to perform moderate and/or high complexity testing. This test has not been FDA cleared or approved. A negative result does not rule out the presence of PCR inhibitors in the specimen or target RNA in concentration below the limit of detection for the assay. If only one viral target is positive but coinfection with multiple targets is suspected, the sample should be re-tested with another FDA cleared, approved or authorized test, if coinfection would change clinical management. [facility] are certified under the Clinical Laboratory Improvement Amendments of 1988 (CLIA-88) as  qualified to perform moderate and/or high complexity laboratory testing.   CBC with platelets and differential     Status: Abnormal   Result Value Ref Range    WBC Count 12.6 (H) 4.0 - 11.0 10e3/uL    RBC Count 4.57 3.80 - 5.20 10e6/uL    Hemoglobin 13.8 11.7 - 15.7 g/dL    Hematocrit 43.0 35.0 - 47.0 %    MCV 94 78 - 100 fL    MCH 30.2 26.5 - 33.0 pg    MCHC 32.1 31.5 - 36.5 g/dL    RDW 13.3 10.0 - 15.0 %    Platelet Count 272 150 - 450 10e3/uL    % Neutrophils 85 %    % Lymphocytes 6 %    % Monocytes 7 %    % Eosinophils 1 %    % Basophils 0 %    % Immature Granulocytes 1 %    NRBCs per 100 WBC 0 <1 /100    Absolute Neutrophils 10.6 (H) 1.6 - 8.3 10e3/uL    Absolute Lymphocytes 0.8 0.8 - 5.3 10e3/uL    Absolute Monocytes 0.9 0.0 - 1.3 10e3/uL    Absolute Eosinophils 0.2 0.0 - 0.7 10e3/uL    Absolute Basophils 0.0 0.0 - 0.2 10e3/uL    Absolute Immature Granulocytes 0.1 <=0.4 10e3/uL    Absolute NRBCs 0.0 10e3/uL   Extra Blue Top Tube     Status: None   Result Value Ref Range    Hold Specimen [facility]    Extra Red Top Tube     Status: None   Result Value  Ref Range    Hold Specimen [facility]    Nt probnp inpatient (BNP)     Status: Normal   Result Value Ref Range    N terminal Pro BNP Inpatient 63 0 - 900 pg/mL   Troponin I     Status: Normal   Result Value Ref Range    Troponin I High Sensitivity 4 <54 ng/L   Glucose by meter     Status: Abnormal   Result Value Ref Range    GLUCOSE BY METER POCT 136 (H) 70 - 99 mg/dL   Glucose by meter     Status: Abnormal   Result Value Ref Range    GLUCOSE BY METER POCT 236 (H) 70 - 99 mg/dL   Lactic Acid STAT     Status: Normal   Result Value Ref Range    Lactic Acid 1.8 0.7 - 2.0 mmol/L   Glucose by meter     Status: Abnormal   Result Value Ref Range    GLUCOSE BY METER POCT 227 (H) 70 - 99 mg/dL   Glucose by meter     Status: Abnormal   Result Value Ref Range    GLUCOSE BY METER POCT 172 (H) 70 - 99 mg/dL   Basic metabolic panel     Status: Abnormal   Result Value Ref Range    Sodium 142 133 - 144 mmol/L    Potassium 4.4 3.4 - 5.3 mmol/L    Chloride 110 (H) 94 - 109 mmol/L    Carbon Dioxide (CO2) 30 20 - 32 mmol/L    Anion Gap 2 (L) 3 - 14 mmol/L    Urea Nitrogen 18 7 - 30 mg/dL    Creatinine 0.73 0.52 - 1.04 mg/dL    Calcium 8.4 (L) 8.5 - 10.1 mg/dL    Glucose 163 (H) 70 - 99 mg/dL    GFR Estimate 85 >60 mL/min/1.73m2   CBC with platelets     Status: Abnormal   Result Value Ref Range    WBC Count 10.5 4.0 - 11.0 10e3/uL    RBC Count 4.55 3.80 - 5.20 10e6/uL    Hemoglobin 13.3 11.7 - 15.7 g/dL    Hematocrit 43.5 35.0 - 47.0 %    MCV 96 78 - 100 fL    MCH 29.2 26.5 - 33.0 pg    MCHC 30.6 (L) 31.5 - 36.5 g/dL    RDW 13.2 10.0 - 15.0 %    Platelet Count 278 150 - 450 10e3/uL   Glucose by meter     Status: Abnormal   Result Value Ref Range    GLUCOSE BY METER POCT 130 (H) 70 - 99 mg/dL   Glucose by meter     Status: Abnormal   Result Value Ref Range    GLUCOSE BY METER POCT 155 (H) 70 - 99 mg/dL   Glucose by meter     Status: Abnormal   Result Value Ref Range    GLUCOSE BY METER POCT 132 (H) 70 - 99 mg/dL   Glucose by meter     Status:  Abnormal   Result Value Ref Range    GLUCOSE BY METER POCT 139 (H) 70 - 99 mg/dL   Glucose by meter     Status: Abnormal   Result Value Ref Range    GLUCOSE BY METER POCT 110 (H) 70 - 99 mg/dL   Glucose by meter     Status: Normal   Result Value Ref Range    GLUCOSE BY METER POCT 74 70 - 99 mg/dL   Glucose by meter     Status: Abnormal   Result Value Ref Range    GLUCOSE BY METER POCT 101 (H) 70 - 99 mg/dL   Glucose by meter     Status: Normal   Result Value Ref Range    GLUCOSE BY METER POCT 73 70 - 99 mg/dL   Glucose by meter     Status: Normal   Result Value Ref Range    GLUCOSE BY METER POCT 70 70 - 99 mg/dL   Glucose by meter     Status: Abnormal   Result Value Ref Range    GLUCOSE BY METER POCT 121 (H) 70 - 99 mg/dL   Glucose by meter     Status: Abnormal   Result Value Ref Range    GLUCOSE BY METER POCT 130 (H) 70 - 99 mg/dL   Glucose by meter     Status: Abnormal   Result Value Ref Range    GLUCOSE BY METER POCT 116 (H) 70 - 99 mg/dL   Glucose by meter     Status: Abnormal   Result Value Ref Range    GLUCOSE BY METER POCT 103 (H) 70 - 99 mg/dL   CBC with platelets     Status: Normal   Result Value Ref Range    WBC Count 6.8 4.0 - 11.0 10e3/uL    RBC Count 4.42 3.80 - 5.20 10e6/uL    Hemoglobin 13.5 11.7 - 15.7 g/dL    Hematocrit 40.9 35.0 - 47.0 %    MCV 93 78 - 100 fL    MCH 30.5 26.5 - 33.0 pg    MCHC 33.0 31.5 - 36.5 g/dL    RDW 13.2 10.0 - 15.0 %    Platelet Count 252 150 - 450 10e3/uL   Glucose by meter     Status: Normal   Result Value Ref Range    GLUCOSE BY METER POCT 91 70 - 99 mg/dL   Glucose by meter     Status: Normal   Result Value Ref Range    GLUCOSE BY METER POCT 96 70 - 99 mg/dL   Care Management / Social Work IP Consult     Status: None ()    Franchesca Thomas RN     12/9/2021  2:29 PM  Care Management Note    Discharge Date: 12/10/2021       Discharge Disposition: Home    Discharge Services: None    Discharge DME: None    Discharge Transportation: family or friend will  provide    Private pay costs discussed: Not applicable      Handoff Referral Completed: Yes    Additional Information:  Met with pt has she was admitted with pna >65 years old. Pt   states she does not use oxygen at home. She is not open with any   home care services currently. She lives at home with her ,   John who manages her medications for her. She has a 4ww with a   seat that she uses when she goes outside the home. Pt did mention   she could use grab bars in the bathroom but is unable to afford   them. Pt request assistance with scheduling a follow up   appointment with PCP. [facility] request made, pt knows to look on   discharge instructions for follow up appointment.    Madonna Coley RN, BSN CTS  Care Coordinator  [facility]   [phone]               Blood Culture Arm, Left     Status: Normal (Preliminary result)    Specimen: Arm, Left; Blood   Result Value Ref Range    Culture No growth after 2 days    Blood Culture Hand, Right     Status: Normal (Preliminary result)    Specimen: Hand, Right; Blood   Result Value Ref Range    Culture No growth after 2 days    Clostridium difficile toxin B PCR     Status: Normal    Specimen: Per Rectum; Stool   Result Value Ref Range    C Difficile Toxin B by PCR Negative Negative    Narrative    The [employer] Xpert C. difficile Assay, performed on the [employer]  Instrument Systems, is a qualitative in vitro diagnostic test for rapid detection of toxin B gene sequences from unformed (liquid or soft) stool specimens collected from patients suspected of having Clostridioides difficile infection (CDI). The test utilizes automated real-time polymerase chain reaction (PCR) to detect toxin gene sequences associated with toxin producing C. difficile. The Xpert C. difficile Assay is intended as an aid in the diagnosis of CDI.   CBC with platelets differential     Status: Abnormal    Narrative    The following orders were created for panel order  CBC with platelets differential.  Procedure                               Abnormality         Status                     ---------                               -----------         ------                     CBC with platelets and d...[[id number]]  Abnormal            Final result                 Please view results for these tests on the individual orders.   Extra Tube ([place] Draw)     Status: None    Narrative    The following orders were created for panel order Extra Tube ([place] Draw).  Procedure                               Abnormality         Status                     ---------                               -----------         ------                     Extra Blue Top Tube[[id number]]                              Final result               Extra Red Top Tube[[id number]]                               Final result                 Please view results for these tests on the individual orders.

## 2021-12-11 NOTE — PLAN OF CARE
"Temp: 99  F (37.2  C) Temp src: Oral BP: 131/81 Pulse: 83   Resp: 20 SpO2: 95 % O2 Device: None (Room air)     A&O. Up SBA-Ax1. Complained of a headache at end of shift however pt refused pain meds \"I take enough meds the way it is\". 2 recorded episodes of diarrhea evening shift, stool sample collected- results pending. Expiratory wheezes. Plan- oral augmentin- no IV access provider is aware of this, stool sample pending.   "

## 2021-12-12 VITALS
BODY MASS INDEX: 33.94 KG/M2 | DIASTOLIC BLOOD PRESSURE: 68 MMHG | TEMPERATURE: 98.3 F | RESPIRATION RATE: 20 BRPM | WEIGHT: 203.7 LBS | OXYGEN SATURATION: 92 % | HEIGHT: 65 IN | HEART RATE: 77 BPM | SYSTOLIC BLOOD PRESSURE: 112 MMHG

## 2021-12-12 LAB
GLUCOSE BLDC GLUCOMTR-MCNC: 129 MG/DL (ref 70–99)
GLUCOSE BLDC GLUCOMTR-MCNC: 89 MG/DL (ref 70–99)

## 2021-12-12 PROCEDURE — 99239 HOSP IP/OBS DSCHRG MGMT >30: CPT | Performed by: PHYSICIAN ASSISTANT

## 2021-12-12 PROCEDURE — 250N000013 HC RX MED GY IP 250 OP 250 PS 637: Performed by: INTERNAL MEDICINE

## 2021-12-12 PROCEDURE — 250N000013 HC RX MED GY IP 250 OP 250 PS 637: Performed by: PHYSICIAN ASSISTANT

## 2021-12-12 RX ORDER — GUAIFENESIN/DEXTROMETHORPHAN 100-10MG/5
10 SYRUP ORAL EVERY 4 HOURS PRN
COMMUNITY
Start: 2021-12-12 | End: 2021-12-15

## 2021-12-12 RX ORDER — LACTOBACILLUS RHAMNOSUS GG 10B CELL
1 CAPSULE ORAL 2 TIMES DAILY
COMMUNITY
Start: 2021-12-12

## 2021-12-12 RX ADMIN — AZITHROMYCIN MONOHYDRATE 250 MG: 250 TABLET ORAL at 08:51

## 2021-12-12 RX ADMIN — CARBIDOPA AND LEVODOPA 2 TABLET: 25; 100 TABLET ORAL at 06:30

## 2021-12-12 RX ADMIN — GABAPENTIN 300 MG: 300 CAPSULE ORAL at 10:29

## 2021-12-12 RX ADMIN — AMOXICILLIN AND CLAVULANATE POTASSIUM 1 TABLET: 875; 125 TABLET, FILM COATED ORAL at 08:51

## 2021-12-12 RX ADMIN — Medication 1 CAPSULE: at 10:29

## 2021-12-12 RX ADMIN — CLONAZEPAM 0.5 MG: 0.5 TABLET ORAL at 08:51

## 2021-12-12 RX ADMIN — CARBIDOPA AND LEVODOPA 1 TABLET: 25; 100 TABLET ORAL at 10:29

## 2021-12-12 RX ADMIN — ROPINIROLE HYDROCHLORIDE 2 MG: 2 TABLET, FILM COATED ORAL at 06:30

## 2021-12-12 RX ADMIN — Medication 1 HALF-TAB: at 10:29

## 2021-12-12 ASSESSMENT — ACTIVITIES OF DAILY LIVING (ADL)
ADLS_ACUITY_SCORE: 8
ADLS_ACUITY_SCORE: 6
ADLS_ACUITY_SCORE: 10
ADLS_ACUITY_SCORE: 8
ADLS_ACUITY_SCORE: 6
ADLS_ACUITY_SCORE: 8
ADLS_ACUITY_SCORE: 10
ADLS_ACUITY_SCORE: 6
ADLS_ACUITY_SCORE: 10
ADLS_ACUITY_SCORE: 6

## 2021-12-12 ASSESSMENT — MIFFLIN-ST. JEOR: SCORE: 1459.86

## 2021-12-12 NOTE — PLAN OF CARE
Patient's After Visit Summary was reviewed with patient.   Patient verbalized understanding of After Visit Summary, recommended follow up and was given an opportunity to ask questions.   Discharge medications sent home with patient/family: sent to preferred pharmacy   Discharged with spouse - awaiting for ride.    OBSERVATION patient END time: [id number]

## 2021-12-12 NOTE — PLAN OF CARE
Vitals are Temp: 97.8  F (36.6  C) Temp src: Oral BP: 94/44 Pulse: 65   Resp: 20 SpO2: 97 %.    Patient is Alert and Oriented x4. Up SBA. Has baseline tremors to arms, worse on right. Denies pain. Regular diet. BG 89 at 2 am. Pt has no IV access. LS diminished. On 1 L NC overnight. Continuing Augmentin and azithromycin. Wean O2 as able.

## 2021-12-12 NOTE — DISCHARGE SUMMARY
[facility]    Discharge Summary  Hospitalist    Date of Admission:  12/8/2021  Date of Discharge:  12/12/2021  Provider:  Debra Walker PA-C  Date of Service (when I last saw the patient): 12/12/21    Discharge Diagnoses   Acute hypoxemic respiratory failure due to suspect early CAP versus bronchitis  Abdominal pain and diarrhea, r/o c diff and likely side effect of antibiotics     Other medical issues:  Parkinson's disease  DM2  HTN  Anxiety  AMELIA    History of Present Illness   Vi Gee is a vaccinated 66 year old female with history of Parkinsons disease with deep brain stimulator, DM II, HTN, anxiety, and sleep apnea admitted on 12/8/2021 with cough, fever and shortness of breath. On admission she was febrile to 101.4 with heart rate 102, pressure 140/72 and hypoxic down to 86% on RA with improvement on 2 liters. WBC elevated to 12.6 but lactic acid normal. BMP and LFTs unremarkable. UA negative. Blood cultures drawn, Covid/influenza negative and CT PE study negative for PE but did have mild right basilar pulmonary opacities. She was started on Ceftriaxone and Azithromycin with IV fluids with improvement of respiratory symptoms. Please see the admission history and physical for full details.    Hospital Course   Vi Gee was admitted on 12/8/2021.  The following problems were addressed during her hospitalization:     #Acute hypoxemic respiratory failure due to suspected early community-acquired pneumonia versus bronchitis   Complains of a few weeks of respiratory symptoms with cough, fever and shortness of breath. CT scan on admission shows mild right basilar pulmonary opacities consistent with community acquired pneumonia, negative for PE. She was started on Azithromycin and Ceftriaxone. She was stable on 2 liters which was weaned to room air on 12/10 but did require supplemental oxygen again overnight, suspect due to h/o AMELIA and was stable on RA when awake prior to  "discharge.   -Received Ceftriaxone 12/8-12/10 and will receive Azithromycin 12/8-12/12  -Loss IV on 12/10 so started Augmentin 875/125 BID   -Robitussin DM PRN  -Encourage ongoing IS  -discussed using home nebulizers treatments PRN if feeling short of breath  -Blood cultures negative since 12/8     #Abdominal pain and diarrhea  New onset abdominal pain, poor appetite and watery diarrhea multiple times on 12/10, a couple episodes on 12/11 without associated pain but still watery.   -C diff test negative on 12/10  -suspect related to antibiotics   -start probiotic daily on 12/11  -PO intake improving and substantial prior to discharge to keep up with diarrhea while completing antibiotics      #History of Parkinson's disease with deep brain stimulator  -Continue PTA Sinemet      #History of anxiety and depression  -Continue Clonazepam, Wellbutrin, Trazodone and Cymbalta      #DM II  Glucose 110 in ED, A1c 6.4 on 9/9, BG stable during stay  -medium resistance sliding scale while admitted   -Ozempic monthly     #Adrenal incidentaloma  -Outpatient follow-up.     #History of obstructive sleep apnea     #RLS  -On Ropinirole    Pending Results   Unresulted Labs Ordered in the Past 30 Days of this Admission     Date and Time Order Name Status Description    12/8/2021  9:47 AM Blood Culture Hand, Right Preliminary     12/8/2021  9:47 AM Blood Culture Arm, Left Preliminary         Code Status   Full Code       Primary Care Physician   Kristina Lockwood    Exam:    /65   Pulse 77   Temp 98.6  F (37  C) (Oral)   Resp 20   Ht 1.651 m (5' 5\")   Wt 92.4 kg (203 lb 11.2 oz)   SpO2 93%   BMI 33.90 kg/m    GEN:  Alert, oriented x 3, appears comfortable, NAD.  HEENT:  Normocephalic/atraumatic, no scleral icterus, no nasal discharge, mouth moist.  CV:  Regular rate and rhythm, no murmur or JVD.  S1 + S2 noted, no S3 or S4.  LUNGS:  Clear to auscultation bilaterally without rales/rhonchi/wheezing/retractions.  Symmetric " chest rise on inhalation noted.  ABD:  Active bowel sounds, soft, non-tender/non-distended.  No rebound/guarding/rigidity.  EXT:  No edema.  No cyanosis.  No acute joint synovitis noted.  SKIN:  Dry to touch, no exanthems noted in the visualized areas.    Discharge Disposition   Discharged to home    Consultations This Hospital Stay   CARE MANAGEMENT / SOCIAL WORK IP CONSULT    Time Spent on this Encounter   I, Tonia Walker PA-C, personally saw the patient today and spent greater than 30 minutes discharging this patient.    Discharge Orders      Reason for your hospital stay    You were admitted for symptoms related to pneumonia. You were started on IV antibiotics and fluid support for dehydration. We have provided you with oral antibiotics that you should take for the full course, completed Azithromycin while admitted and will have only two additional days of Augmentin at discharge. Your oxygen levels were low initially and you were put on oxygen while admitted but this has improved and you do not need oxygen now. You received Robitussin to help with cough and we recommend picking those up over the counter. You can use your home albuterol nebulizer if you have wheezing or shortness of breath upon discharge.  During your stay you did have diarrhea with C. difficile testing negative unlikely related to antibiotics.  Due to this it is recommended you continue an over-the-counter probiotic while at antibiotics at minimal the likely for the next week to try to improve your gut scott and hopefully have resolution of your diarrhea.     Follow-up and recommended labs and tests     Follow up with primary care provider, Kristina Lockwood, within 7 days for hospital follow- up.  No follow up labs or test are needed.     Activity    Your activity upon discharge: activity as tolerated     Diet    Follow this diet upon discharge: Orders Placed This Encounter      Combination Diet Regular Diet Adult     Discharge  Medications   Current Discharge Medication List      START taking these medications    Details   amoxicillin-clavulanate (AUGMENTIN) 875-125 MG tablet Take 1 tablet by mouth every 12 hours for 2 days  Qty: 4 tablet, Refills: 0    Associated Diagnoses: Acute respiratory failure with hypoxia (H); Pneumonia due to infectious organism, unspecified laterality, unspecified part of lung      guaiFENesin-dextromethorphan (ROBITUSSIN DM) 100-10 MG/5ML syrup Take 10 mLs by mouth every 4 hours as needed for cough    Associated Diagnoses: Acute respiratory failure with hypoxia (H); Pneumonia due to infectious organism, unspecified laterality, unspecified part of lung      lactobacillus rhamnosus, GG, (CULTURELL) capsule Take 1 capsule by mouth 2 times daily    Associated Diagnoses: Diarrhea, unspecified type         CONTINUE these medications which have NOT CHANGED    Details   albuterol (PROVENTIL) (2.5 MG/3ML) 0.083% neb solution Take 1 vial by nebulization every 6 hours as needed for wheezing      buPROPion (WELLBUTRIN XL) 150 MG 24 hr tablet Take 150 mg by mouth daily      !! carbidopa-levodopa (SINEMET)  MG tablet TAKE 2 TABLETS @7AM, TAKE 1 & 1/2 TABLETS @10AM, 1PM, 4PM, 7PM & 10PM      !! carbidopa-levodopa (SINEMET)  MG tablet Take 1 tablet by mouth daily as needed Take at 2am if patient needs it      !! clonazePAM (KLONOPIN) 0.5 MG tablet Take 0.5 mg by mouth every morning      !! clonazePAM (KLONOPIN) 0.5 MG tablet Take 1 mg by mouth At Bedtime      DULoxetine (CYMBALTA) 60 MG capsule Take 60 mg by mouth At Bedtime       !! gabapentin (NEURONTIN) 300 MG capsule Take 1,500 mg by mouth At Bedtime      !! gabapentin (NEURONTIN) 300 MG capsule Take 300 mg by mouth 2 times daily At 10am and 4pm      nystatin (MYCOSTATIN) 608074 UNIT/ML suspension Swish and spit 5-10 mLs in mouth 4 times daily Until clear, then repeat course as needed      OZEMPIC, 0.25 OR 0.5 MG/DOSE, 2 MG/1.5ML SOPN pen once a week Inject 0.25  mg subcutaneous weekly for 4 weeks, then 0.5 mg weekly after that      rOPINIRole (REQUIP) 2 MG tablet TAKE 1 TABLET BY MOUTH AT 7AM, TAKE 1/2 TABLET AT 1PM, TAKE 1 TABLET AT 4PM, AND 1 TAB AT 10PM      traZODone (DESYREL) 100 MG tablet Take 150 mg by mouth At Bedtime      trimethoprim-polymyxin b (POLYTRIM) 32740-3.1 UNIT/ML-% ophthalmic solution Place 2 drops into both eyes 3 times daily        !! - Potential duplicate medications found. Please discuss with provider.        Allergies   Allergies   Allergen Reactions     Celecoxib      Clindamycin      Epinephrine      Data   Results for orders placed or performed during the hospital encounter of 12/08/21   XR Chest Port 1 View     Status: None    Narrative    CHEST ONE VIEW PORTABLE  12/8/2021 10:05 AM     HISTORY: Fever, cough.    COMPARISON: Chest x-ray on 7/30/2010      Impression    IMPRESSION: Single AP view of the chest was obtained. Right and left  chest wall implantable neurostimulator device. Cardiomediastinal  silhouette is within normal limits. No suspicious focal pulmonary  opacities. No significant pleural effusion or pneumothorax.  Postsurgical changes of the right rib.    PRICILLA CASTRO MD         SYSTEM ID:  [id number]   CT Chest Pulmonary Embolism w Contrast     Status: None    Narrative    CT CHEST PULMONARY EMBOLISM WITH CONTRAST December 8, 2021 11:45 AM    HISTORY: Pulmonary embolus suspected, low/intermediate probability,  positive D-dimer. Cough unexplained, tachycardia, chest pain, evaluate  pulmonary embolism.    COMPARISON: Chest x-ray on same day earlier.    TECHNIQUE: Scans obtained from the apices through the diaphragm with  IV contrast. 80mL Isovue-370 IV injected. Radiation dose for this scan  was reduced using automated exposure control, adjustment of the mA  and/or kV according to patient size, or iterative reconstruction  technique.    FINDINGS:  Chest/mediastinum: No evidence of pulmonary embolism. Mild  cardiomegaly. No  significant pericardial effusion. Multiple prominent  mediastinal and hilar lymph nodes, indeterminate, could be reactive.  Bilateral chest wall electronic implantable devices. Mild  atherosclerotic vascular calcification of the coronary arteries.    Lungs and pleura: No pleural effusion or pneumothorax. Bibasilar  pulmonary opacities, likely atelectasis. 10 mm nodular opacity along  the lung fissure (series 9 image 87), likely represent intrafissural  lymph node.    Upper abdomen: Limited evaluation of upper abdomen due to lack of  coverage and timing of contrast. Diffuse echogenicity of the liver,  likely due to underlying hepatic steatosis. Partially visualized  possibly left hepatic cyst 1 cm left adrenal nodule, indeterminate,  could represent adrenal adenoma.    Bones and soft tissue: Multilevel degenerative changes of the spine.  No suspicious osseous lesion. Postsurgical changes of the right ribs.      Impression    IMPRESSION:   1. No evidence of pulmonary embolism.  2. Mild right basilar pulmonary opacities, likely atelectasis.  3. Hepatic steatosis.  4. 1 cm left adrenal nodule, indeterminate, could represent adrenal  adenoma. This can be further evaluated with CT or MRI adrenal mass  protocol.    PRICILLA CASTRO MD         SYSTEM ID:  [id number]   Comprehensive metabolic panel     Status: Abnormal   Result Value Ref Range    Sodium 137 133 - 144 mmol/L    Potassium 4.3 3.4 - 5.3 mmol/L    Chloride 102 94 - 109 mmol/L    Carbon Dioxide (CO2) 29 20 - 32 mmol/L    Anion Gap 6 3 - 14 mmol/L    Urea Nitrogen 10 7 - 30 mg/dL    Creatinine 0.85 0.52 - 1.04 mg/dL    Calcium 8.8 8.5 - 10.1 mg/dL    Glucose 110 (H) 70 - 99 mg/dL    Alkaline Phosphatase 100 40 - 150 U/L    AST 18 0 - 45 U/L    ALT 9 0 - 50 U/L    Protein Total 8.0 6.8 - 8.8 g/dL    Albumin 3.6 3.4 - 5.0 g/dL    Bilirubin Total 0.5 0.2 - 1.3 mg/dL    GFR Estimate 71 >60 mL/min/1.73m2   Lactic acid whole blood     Status: Normal   Result Value Ref  Range    Lactic Acid 1.4 0.7 - 2.0 mmol/L   UA with Microscopic reflex to Culture     Status: Abnormal    Specimen: Urine, Midstream   Result Value Ref Range    Color Urine Light Yellow Colorless, Straw, Light Yellow, Yellow    Appearance Urine Clear Clear    Glucose Urine Negative Negative mg/dL    Bilirubin Urine Negative Negative    Ketones Urine Negative Negative mg/dL    Specific Gravity Urine 1.014 1.003 - 1.035    Blood Urine Negative Negative    pH Urine 5.5 5.0 - 7.0    Protein Albumin Urine Negative Negative mg/dL    Urobilinogen Urine Normal Normal, 2.0 mg/dL    Nitrite Urine Negative Negative    Leukocyte Esterase Urine Negative Negative    Mucus Urine Present (A) None Seen /LPF    RBC Urine <1 <=2 /HPF    WBC Urine <1 <=5 /HPF    Squamous Epithelials Urine <1 <=1 /HPF    Narrative    Urine Culture not indicated   Symptomatic Influenza A/B & SARS-CoV2 (COVID-19) Virus PCR Multiplex Nasopharyngeal     Status: Normal    Specimen: Nasopharyngeal; Swab   Result Value Ref Range    Influenza A PCR Negative Negative    Influenza B PCR Negative Negative    SARS CoV2 PCR Negative Negative    Narrative    Testing was performed using the vanesa SARS-CoV-2 & Influenza A/B Assay on the vanesa Maryann System. This test should be ordered for the detection of SARS-CoV-2 and influenza viruses in individuals who meet clinical and/or epidemiological criteria. Test performance is unknown in asymptomatic patients. This test is for in vitro diagnostic use under the FDA EUA for laboratories certified under CLIA to perform moderate and/or high complexity testing. This test has not been FDA cleared or approved. A negative result does not rule out the presence of PCR inhibitors in the specimen or target RNA in concentration below the limit of detection for the assay. If only one viral target is positive but coinfection with multiple targets is suspected, the sample should be re-tested with another FDA cleared, approved or authorized  test, if coinfection would change clinical management. [facility] are certified under the Clinical Laboratory Improvement Amendments of 1988 (CLIA-88) as  qualified to perform moderate and/or high complexity laboratory testing.   CBC with platelets and differential     Status: Abnormal   Result Value Ref Range    WBC Count 12.6 (H) 4.0 - 11.0 10e3/uL    RBC Count 4.57 3.80 - 5.20 10e6/uL    Hemoglobin 13.8 11.7 - 15.7 g/dL    Hematocrit 43.0 35.0 - 47.0 %    MCV 94 78 - 100 fL    MCH 30.2 26.5 - 33.0 pg    MCHC 32.1 31.5 - 36.5 g/dL    RDW 13.3 10.0 - 15.0 %    Platelet Count 272 150 - 450 10e3/uL    % Neutrophils 85 %    % Lymphocytes 6 %    % Monocytes 7 %    % Eosinophils 1 %    % Basophils 0 %    % Immature Granulocytes 1 %    NRBCs per 100 WBC 0 <1 /100    Absolute Neutrophils 10.6 (H) 1.6 - 8.3 10e3/uL    Absolute Lymphocytes 0.8 0.8 - 5.3 10e3/uL    Absolute Monocytes 0.9 0.0 - 1.3 10e3/uL    Absolute Eosinophils 0.2 0.0 - 0.7 10e3/uL    Absolute Basophils 0.0 0.0 - 0.2 10e3/uL    Absolute Immature Granulocytes 0.1 <=0.4 10e3/uL    Absolute NRBCs 0.0 10e3/uL   Extra Blue Top Tube     Status: None   Result Value Ref Range    Hold Specimen JIC    Extra Red Top Tube     Status: None   Result Value Ref Range    Hold Specimen JIC    Nt probnp inpatient (BNP)     Status: Normal   Result Value Ref Range    N terminal Pro BNP Inpatient 63 0 - 900 pg/mL   Troponin I     Status: Normal   Result Value Ref Range    Troponin I High Sensitivity 4 <54 ng/L   Glucose by meter     Status: Abnormal   Result Value Ref Range    GLUCOSE BY METER POCT 136 (H) 70 - 99 mg/dL   Glucose by meter     Status: Abnormal   Result Value Ref Range    GLUCOSE BY METER POCT 236 (H) 70 - 99 mg/dL   Lactic Acid STAT     Status: Normal   Result Value Ref Range    Lactic Acid 1.8 0.7 - 2.0 mmol/L   Glucose by meter     Status: Abnormal   Result Value Ref Range    GLUCOSE BY METER POCT 227 (H) 70 - 99 mg/dL   Glucose by meter      Status: Abnormal   Result Value Ref Range    GLUCOSE BY METER POCT 172 (H) 70 - 99 mg/dL   Basic metabolic panel     Status: Abnormal   Result Value Ref Range    Sodium 142 133 - 144 mmol/L    Potassium 4.4 3.4 - 5.3 mmol/L    Chloride 110 (H) 94 - 109 mmol/L    Carbon Dioxide (CO2) 30 20 - 32 mmol/L    Anion Gap 2 (L) 3 - 14 mmol/L    Urea Nitrogen 18 7 - 30 mg/dL    Creatinine 0.73 0.52 - 1.04 mg/dL    Calcium 8.4 (L) 8.5 - 10.1 mg/dL    Glucose 163 (H) 70 - 99 mg/dL    GFR Estimate 85 >60 mL/min/1.73m2   CBC with platelets     Status: Abnormal   Result Value Ref Range    WBC Count 10.5 4.0 - 11.0 10e3/uL    RBC Count 4.55 3.80 - 5.20 10e6/uL    Hemoglobin 13.3 11.7 - 15.7 g/dL    Hematocrit 43.5 35.0 - 47.0 %    MCV 96 78 - 100 fL    MCH 29.2 26.5 - 33.0 pg    MCHC 30.6 (L) 31.5 - 36.5 g/dL    RDW 13.2 10.0 - 15.0 %    Platelet Count 278 150 - 450 10e3/uL   Glucose by meter     Status: Abnormal   Result Value Ref Range    GLUCOSE BY METER POCT 130 (H) 70 - 99 mg/dL   Glucose by meter     Status: Abnormal   Result Value Ref Range    GLUCOSE BY METER POCT 155 (H) 70 - 99 mg/dL   Glucose by meter     Status: Abnormal   Result Value Ref Range    GLUCOSE BY METER POCT 132 (H) 70 - 99 mg/dL   Glucose by meter     Status: Abnormal   Result Value Ref Range    GLUCOSE BY METER POCT 139 (H) 70 - 99 mg/dL   Glucose by meter     Status: Abnormal   Result Value Ref Range    GLUCOSE BY METER POCT 110 (H) 70 - 99 mg/dL   Glucose by meter     Status: Normal   Result Value Ref Range    GLUCOSE BY METER POCT 74 70 - 99 mg/dL   Glucose by meter     Status: Abnormal   Result Value Ref Range    GLUCOSE BY METER POCT 101 (H) 70 - 99 mg/dL   Glucose by meter     Status: Normal   Result Value Ref Range    GLUCOSE BY METER POCT 73 70 - 99 mg/dL   Glucose by meter     Status: Normal   Result Value Ref Range    GLUCOSE BY METER POCT 70 70 - 99 mg/dL   Glucose by meter     Status: Abnormal   Result Value Ref Range    GLUCOSE BY METER POCT  121 (H) 70 - 99 mg/dL   Glucose by meter     Status: Abnormal   Result Value Ref Range    GLUCOSE BY METER POCT 130 (H) 70 - 99 mg/dL   Glucose by meter     Status: Abnormal   Result Value Ref Range    GLUCOSE BY METER POCT 116 (H) 70 - 99 mg/dL   Glucose by meter     Status: Abnormal   Result Value Ref Range    GLUCOSE BY METER POCT 103 (H) 70 - 99 mg/dL   CBC with platelets     Status: Normal   Result Value Ref Range    WBC Count 6.8 4.0 - 11.0 10e3/uL    RBC Count 4.42 3.80 - 5.20 10e6/uL    Hemoglobin 13.5 11.7 - 15.7 g/dL    Hematocrit 40.9 35.0 - 47.0 %    MCV 93 78 - 100 fL    MCH 30.5 26.5 - 33.0 pg    MCHC 33.0 31.5 - 36.5 g/dL    RDW 13.2 10.0 - 15.0 %    Platelet Count 252 150 - 450 10e3/uL   Glucose by meter     Status: Normal   Result Value Ref Range    GLUCOSE BY METER POCT 91 70 - 99 mg/dL   Glucose by meter     Status: Normal   Result Value Ref Range    GLUCOSE BY METER POCT 96 70 - 99 mg/dL   Glucose by meter     Status: Abnormal   Result Value Ref Range    GLUCOSE BY METER POCT 119 (H) 70 - 99 mg/dL   Glucose by meter     Status: Abnormal   Result Value Ref Range    GLUCOSE BY METER POCT 122 (H) 70 - 99 mg/dL   Glucose by meter     Status: Abnormal   Result Value Ref Range    GLUCOSE BY METER POCT 183 (H) 70 - 99 mg/dL   Glucose by meter     Status: Normal   Result Value Ref Range    GLUCOSE BY METER POCT 89 70 - 99 mg/dL   Glucose by meter     Status: Abnormal   Result Value Ref Range    GLUCOSE BY METER POCT 129 (H) 70 - 99 mg/dL   Care Management / Social Work IP Consult     Status: None ()    Franchesca Thomas RN     12/9/2021  2:29 PM  Care Management Note    Discharge Date: 12/10/2021       Discharge Disposition: Home    Discharge Services: None    Discharge DME: None    Discharge Transportation: family or friend will provide    Private pay costs discussed: Not applicable      Handoff Referral Completed: Yes    Additional Information:  Met with pt has she was admitted with pna  >65 years old. Pt   states she does not use oxygen at home. She is not open with any   home care services currently. She lives at home with her ,   John who manages her medications for her. She has a 4ww with a   seat that she uses when she goes outside the home. Pt did mention   she could use grab bars in the bathroom but is unable to afford   them. Pt request assistance with scheduling a follow up   appointment with PCP. [facility] request made, pt knows to look on   discharge instructions for follow up appointment.    Madonna Coley RN, BSN CTS  Care Coordinator  [facility]   [phone]               Blood Culture Arm, Left     Status: Normal (Preliminary result)    Specimen: Arm, Left; Blood   Result Value Ref Range    Culture No growth after 3 days    Blood Culture Hand, Right     Status: Normal (Preliminary result)    Specimen: Hand, Right; Blood   Result Value Ref Range    Culture No growth after 3 days    Clostridium difficile toxin B PCR     Status: Normal    Specimen: Per Rectum; Stool   Result Value Ref Range    C Difficile Toxin B by PCR Negative Negative    Narrative    The [employer]tracker Xpert C. difficile Assay, performed on the [employer]  Instrument Systems, is a qualitative in vitro diagnostic test for rapid detection of toxin B gene sequences from unformed (liquid or soft) stool specimens collected from patients suspected of having Clostridioides difficile infection (CDI). The test utilizes automated real-time polymerase chain reaction (PCR) to detect toxin gene sequences associated with toxin producing C. difficile. The Xpert C. difficile Assay is intended as an aid in the diagnosis of CDI.   CBC with platelets differential     Status: Abnormal    Narrative    The following orders were created for panel order CBC with platelets differential.  Procedure                               Abnormality         Status                     ---------                                -----------         ------                     CBC with platelets and d...[[id number]]  Abnormal            Final result                 Please view results for these tests on the individual orders.   Extra Tube ([place] Draw)     Status: None    Narrative    The following orders were created for panel order Extra Tube ([place] Draw).  Procedure                               Abnormality         Status                     ---------                               -----------         ------                     Extra Blue Top Tube[[id number]]                              Final result               Extra Red Top Tube[[id number]]                               Final result                 Please view results for these tests on the individual orders.     Tonia Walker PA-C

## 2021-12-13 ENCOUNTER — PATIENT OUTREACH (OUTPATIENT)
Dept: CARE COORDINATION | Facility: CLINIC | Age: 66
End: 2021-12-13
Payer: COMMERCIAL

## 2021-12-13 DIAGNOSIS — Z71.89 OTHER SPECIFIED COUNSELING: ICD-10-CM

## 2021-12-13 LAB
BACTERIA BLD CULT: NO GROWTH
BACTERIA BLD CULT: NO GROWTH

## 2021-12-13 NOTE — PROGRESS NOTES
"Clinic Care Coordination Contact  [facility]: Post-Discharge Note  SITUATION                                                      Admission:    Admission Date: 12/08/21   Reason for Admission: Fever and cough  Discharge:   Discharge Date: 12/12/21  Discharge Diagnosis: Acute hypoxemic respiratory failure    BACKGROUND                                                      Vi Gee is a 66 year old female with PMH including longstanding Parkinson's disease with brain stimulator, hypertension, diabetes, anxiety, sleep apnea who presents with cough, fever and shortness of breath.  She has been having symptoms for the past few weeks and note that her  is actually ill with somewhat similar symptoms as well.  She has been vaccinated and boosted against COVID-19.      ASSESSMENT           Discharge Assessment  How are you doing now that you are home?: \" I am doing just fine\"  How are your symptoms? (Red Flag symptoms escalate to triage hotline per guidelines): Other  Do you feel your condition is stable enough to be safe at home until your provider visit?: Yes  Does the patient have their discharge instructions? : Yes  Does the patient have questions regarding their discharge instructions? : No  Were you started on any new medications or were there changes to any of your previous medications? : Yes  Does the patient have all of their medications?: Yes  Do you have questions regarding any of your medications? : No  Do you have all of your needed medical supplies or equipment (DME)?  (i.e. oxygen tank, CPAP, cane, etc.): Yes  Discharge follow-up appointment scheduled within 14 calendar days? : No  Is patient agreeable to assistance with scheduling? : No (pt will call her PCP clinic to schedule a follow-up)    Post-op ([facility] Only)  If the patient had a surgery or procedure, do they have any questions for a nurse?: No           PLAN                                                      Outpatient Plan:  " Follow-up and recommended labs and tests  Follow up with primary care provider, Kristina Lockwood, within 7 days for hospital follow- up. No follow up labs or test  are needed.    No future appointments.      For any urgent concerns, please contact our 24 hour nurse triage line: [phone] ([phone])         Ludy Powell

## 2021-12-15 ENCOUNTER — HOSPITAL ENCOUNTER (EMERGENCY)
Facility: CLINIC | Age: 66
Discharge: HOME | End: 2021-12-16
Attending: EMERGENCY MEDICINE | Admitting: EMERGENCY MEDICINE
Payer: COMMERCIAL

## 2021-12-15 VITALS
HEART RATE: 96 BPM | SYSTOLIC BLOOD PRESSURE: 146 MMHG | DIASTOLIC BLOOD PRESSURE: 82 MMHG | RESPIRATION RATE: 18 BRPM | TEMPERATURE: 98.9 F | OXYGEN SATURATION: 94 %

## 2021-12-15 DIAGNOSIS — S91.311A LACERATION OF RIGHT HEEL, INITIAL ENCOUNTER: ICD-10-CM

## 2021-12-15 PROCEDURE — 99283 EMERGENCY DEPT VISIT LOW MDM: CPT | Mod: 25

## 2021-12-15 PROCEDURE — 12002 RPR S/N/AX/GEN/TRNK2.6-7.5CM: CPT

## 2021-12-16 ENCOUNTER — APPOINTMENT (OUTPATIENT)
Dept: GENERAL RADIOLOGY | Facility: CLINIC | Age: 66
End: 2021-12-16
Attending: EMERGENCY MEDICINE
Payer: COMMERCIAL

## 2021-12-16 PROCEDURE — 90471 IMMUNIZATION ADMIN: CPT | Performed by: EMERGENCY MEDICINE

## 2021-12-16 PROCEDURE — 73630 X-RAY EXAM OF FOOT: CPT | Mod: RT

## 2021-12-16 PROCEDURE — 250N000011 HC RX IP 250 OP 636: Performed by: EMERGENCY MEDICINE

## 2021-12-16 PROCEDURE — 90715 TDAP VACCINE 7 YRS/> IM: CPT | Performed by: EMERGENCY MEDICINE

## 2021-12-16 RX ADMIN — CLOSTRIDIUM TETANI TOXOID ANTIGEN (FORMALDEHYDE INACTIVATED), CORYNEBACTERIUM DIPHTHERIAE TOXOID ANTIGEN (FORMALDEHYDE INACTIVATED), BORDETELLA PERTUSSIS TOXOID ANTIGEN (GLUTARALDEHYDE INACTIVATED), BORDETELLA PERTUSSIS FILAMENTOUS HEMAGGLUTININ ANTIGEN (FORMALDEHYDE INACTIVATED), BORDETELLA PERTUSSIS PERTACTIN ANTIGEN, AND BORDETELLA PERTUSSIS FIMBRIAE 2/3 ANTIGEN 0.5 ML: 5; 2; 2.5; 5; 3; 5 INJECTION, SUSPENSION INTRAMUSCULAR at 00:02

## 2021-12-16 ASSESSMENT — ENCOUNTER SYMPTOMS: WOUND: 1

## 2021-12-16 NOTE — ED PROVIDER NOTES
"  History     Chief Complaint:  Laceration     The history is provided by the patient and medical records.      Vi Gee is a 66 year old female with history of atrial tachycardia, heart murmur, chronic pain syndrome, diabetes, obesity, Parkinson's disease, stroke who presents with laceration. The patient reports that she stepped on \"something\" with her right foot this evening, noting a wound to her right heel. She states that she is unsure what she stepped on. Per triage note, the patient's wound was bandaged by EMS en route and her bleeding is controlled at this time. Per [facility], her last Tdap was on 07/16/03, about 18 years ago.    Review of Systems   Skin: Positive for wound (R foot).   All other systems reviewed and are negative.    Allergies:  Celecoxib  Clindamycin  Epinephrine  Erythromycin    Medications:  Albuterol  Wellbutrin  Sinemet  Klonopin  Cymbalta  Gabapentin  Culturell  Nystatin  Ozempic  Ropinirole  Trazodone    Past Medical History:     Anxiety   Borderline personality disorder   COPD (chronic obstructive pulmonary disease)   History of skin cancer   Hyperparathyroidism   AMELIA (obstructive sleep apnea)   Parkinson disease   Restless legs syndrome (RLS)     Past Surgical History:    Open appendectomy  Endometrial ablation  Tonsillectomy and adenoidectomy  Tubal ligation  [place] teeth removal  Left wrist surgery  Transvaginal sling placement  Retinal detachment surgery  Deep brain stimulator placement x2  Bilateral cataract surgery with IOL  Skin cancer excision  Breast biopsy     Family History:    Father: Lung cancer, jaw cancer, cataract, depression, IBS, macular degeneration  Mother: Alcohol/drug abuse, asthma, COPD, lung cancer, cataract, CAD, dementia, depression, hearing loss, hypertension, migraines, osteoarthritis, osteoporosis, stroke  Sister: Migraines    Social History:  Presents to the emergency department with her   Arrives via car    Physical Exam     Patient Vitals for the " past 24 hrs:   BP Temp Temp src Pulse Resp SpO2   12/15/21 2324 146/82 98.9  F (37.2  C) Temporal 96 18 94 %       Physical Exam  Nursing note and vitals reviewed.  Constitutional: Cooperative.   Pulmonary/Chest: Effort normal.   Musculoskeletal: Normal range of motion of right ankle.  Achilles tendon intact by exam. .   Neurological: Alert. Strength and sensation in right foot normal.   Skin: Skin is warm and dry. No rash noted. Laceration to the right heel.  Psychiatric: Normal mood and affect.     Emergency Department Course     Imaging:  Foot  XR, G/E 3 views, right   Final Result   IMPRESSION: No visible fracture, dislocation or opaque foreign body. Hallux valgus deformity.        Reading per radiology.    Procedures    Laceration Repair        LACERATION:  A simple clean 4.1 cm laceration.      LOCATION:  Right heel      FUNCTION:  Distally sensation, circulation, motor and tendon function are intact.      ANESTHESIA:  Local using Bupivacaine 0.5% total of 8.0 mLs      PREPARATION:  Scrubbing with Shur Clens      DEBRIDEMENT:  No debridement      CLOSURE:  Wound was closed with One Layer.  Skin closed with 7 x 4.0 Nylon using interrupted sutures.     Reviewed:  I reviewed nursing notes, vitals, past medical history, Care Everywhere and [facility]    Assessments:  2333 I obtained history and examined the patient as noted above.   2339 I performed laceration repair.  0042 I rechecked and updated the patient.    Interventions:   0002 Tdap 0.5 mL Intramuscular    Disposition:  The patient was discharged to home.     Impression & Plan     Medical Decision Making:  Vi Gee is a 66 year old female who presented with a laceration to her right heel.  The wound was carefully evaluated and explored.  The laceration was closed with sutures as documented.  At this point there is no evidence of muscular, tendon,  bony damage, or concern for foreign body with this laceration.      We have discussed possible complications,  such as infection, unacceptable scar.  I have recommended that the patient keep the wound dry for 24-48 hours and then avoid submersion for 1 week. The patient understands to watch for signs of symptoms of infection. I have recommended daily dressing changes with antibiotic ointment. The patient understands to follow up with primary care for suture removal as noted in the discharge instructions.    Diagnosis:    ICD-10-CM    1. Laceration of right heel, initial encounter  S91.311A        Scribe Disclosure:  I, Jose Guadalupe Segovia, am serving as a scribe at 11:36 PM on 12/15/2021 to document services personally performed by Pavel Allen MD based on my observations and the provider's statements to me.              Pavel lAlen MD  12/16/21 0152

## 2021-12-16 NOTE — ED TRIAGE NOTES
"Pt \"stepped on something\" with right foot and is unaware of what it was. Pt called EMS, medic bandaged pt and told pt to come to ER for stitches. Lac to heel, bleeding controlled. CMS intact.  "

## 2022-01-14 ENCOUNTER — APPOINTMENT (OUTPATIENT)
Dept: GENERAL RADIOLOGY | Facility: CLINIC | Age: 67
End: 2022-01-14
Attending: EMERGENCY MEDICINE
Payer: COMMERCIAL

## 2022-01-14 ENCOUNTER — HOSPITAL ENCOUNTER (EMERGENCY)
Facility: CLINIC | Age: 67
Discharge: HOME | End: 2022-01-15
Attending: EMERGENCY MEDICINE | Admitting: EMERGENCY MEDICINE
Payer: COMMERCIAL

## 2022-01-14 DIAGNOSIS — J44.1 COPD EXACERBATION (H): ICD-10-CM

## 2022-01-14 LAB
ANION GAP SERPL CALCULATED.3IONS-SCNC: 4 MMOL/L (ref 3–14)
BASE EXCESS BLDV CALC-SCNC: 5.8 MMOL/L (ref -7.7–1.9)
BASOPHILS # BLD AUTO: 0 10E3/UL (ref 0–0.2)
BASOPHILS NFR BLD AUTO: 1 %
BUN SERPL-MCNC: 11 MG/DL (ref 7–30)
CALCIUM SERPL-MCNC: 9 MG/DL (ref 8.5–10.1)
CHLORIDE BLD-SCNC: 104 MMOL/L (ref 94–109)
CO2 SERPL-SCNC: 30 MMOL/L (ref 20–32)
CREAT SERPL-MCNC: 0.73 MG/DL (ref 0.52–1.04)
EOSINOPHIL # BLD AUTO: 0.3 10E3/UL (ref 0–0.7)
EOSINOPHIL NFR BLD AUTO: 6 %
ERYTHROCYTE [DISTWIDTH] IN BLOOD BY AUTOMATED COUNT: 13.7 % (ref 10–15)
GFR SERPL CREATININE-BSD FRML MDRD: 89 ML/MIN/1.73M2
GLUCOSE BLD-MCNC: 167 MG/DL (ref 70–99)
HCO3 BLDV-SCNC: 32 MMOL/L (ref 21–28)
HCT VFR BLD AUTO: 40.4 % (ref 35–47)
HGB BLD-MCNC: 12.8 G/DL (ref 11.7–15.7)
IMM GRANULOCYTES # BLD: 0 10E3/UL
IMM GRANULOCYTES NFR BLD: 1 %
LYMPHOCYTES # BLD AUTO: 0.9 10E3/UL (ref 0.8–5.3)
LYMPHOCYTES NFR BLD AUTO: 16 %
MCH RBC QN AUTO: 30 PG (ref 26.5–33)
MCHC RBC AUTO-ENTMCNC: 31.7 G/DL (ref 31.5–36.5)
MCV RBC AUTO: 95 FL (ref 78–100)
MONOCYTES # BLD AUTO: 0.4 10E3/UL (ref 0–1.3)
MONOCYTES NFR BLD AUTO: 8 %
NEUTROPHILS # BLD AUTO: 4 10E3/UL (ref 1.6–8.3)
NEUTROPHILS NFR BLD AUTO: 68 %
NRBC # BLD AUTO: 0 10E3/UL
NRBC BLD AUTO-RTO: 0 /100
O2/TOTAL GAS SETTING VFR VENT: 21 %
PCO2 BLDV: 52 MM HG (ref 40–50)
PH BLDV: 7.4 [PH] (ref 7.32–7.43)
PLATELET # BLD AUTO: 207 10E3/UL (ref 150–450)
PO2 BLDV: 67 MM HG (ref 25–47)
POTASSIUM BLD-SCNC: 4.2 MMOL/L (ref 3.4–5.3)
RBC # BLD AUTO: 4.27 10E6/UL (ref 3.8–5.2)
SODIUM SERPL-SCNC: 138 MMOL/L (ref 133–144)
WBC # BLD AUTO: 5.7 10E3/UL (ref 4–11)

## 2022-01-14 PROCEDURE — 87636 SARSCOV2 & INF A&B AMP PRB: CPT | Performed by: EMERGENCY MEDICINE

## 2022-01-14 PROCEDURE — 82310 ASSAY OF CALCIUM: CPT | Performed by: EMERGENCY MEDICINE

## 2022-01-14 PROCEDURE — 84145 PROCALCITONIN (PCT): CPT | Performed by: EMERGENCY MEDICINE

## 2022-01-14 PROCEDURE — 96374 THER/PROPH/DIAG INJ IV PUSH: CPT

## 2022-01-14 PROCEDURE — 85025 COMPLETE CBC W/AUTO DIFF WBC: CPT | Performed by: EMERGENCY MEDICINE

## 2022-01-14 PROCEDURE — C9803 HOPD COVID-19 SPEC COLLECT: HCPCS

## 2022-01-14 PROCEDURE — 71045 X-RAY EXAM CHEST 1 VIEW: CPT

## 2022-01-14 PROCEDURE — 250N000013 HC RX MED GY IP 250 OP 250 PS 637: Performed by: EMERGENCY MEDICINE

## 2022-01-14 PROCEDURE — 82803 BLOOD GASES ANY COMBINATION: CPT | Performed by: EMERGENCY MEDICINE

## 2022-01-14 PROCEDURE — 36415 COLL VENOUS BLD VENIPUNCTURE: CPT | Performed by: EMERGENCY MEDICINE

## 2022-01-14 PROCEDURE — 250N000009 HC RX 250: Performed by: EMERGENCY MEDICINE

## 2022-01-14 PROCEDURE — 250N000011 HC RX IP 250 OP 636: Performed by: EMERGENCY MEDICINE

## 2022-01-14 PROCEDURE — 99284 EMERGENCY DEPT VISIT MOD MDM: CPT | Mod: 25

## 2022-01-14 PROCEDURE — 94640 AIRWAY INHALATION TREATMENT: CPT

## 2022-01-14 RX ORDER — ACETAMINOPHEN 325 MG/1
650 TABLET ORAL ONCE
Status: COMPLETED | OUTPATIENT
Start: 2022-01-14 | End: 2022-01-14

## 2022-01-14 RX ORDER — METHYLPREDNISOLONE SODIUM SUCCINATE 125 MG/2ML
125 INJECTION, POWDER, LYOPHILIZED, FOR SOLUTION INTRAMUSCULAR; INTRAVENOUS ONCE
Status: COMPLETED | OUTPATIENT
Start: 2022-01-14 | End: 2022-01-14

## 2022-01-14 RX ORDER — IPRATROPIUM BROMIDE AND ALBUTEROL SULFATE 2.5; .5 MG/3ML; MG/3ML
3 SOLUTION RESPIRATORY (INHALATION) ONCE
Status: COMPLETED | OUTPATIENT
Start: 2022-01-14 | End: 2022-01-14

## 2022-01-14 RX ADMIN — ACETAMINOPHEN 650 MG: 325 TABLET, FILM COATED ORAL at 22:26

## 2022-01-14 RX ADMIN — METHYLPREDNISOLONE SODIUM SUCCINATE 125 MG: 125 INJECTION, POWDER, FOR SOLUTION INTRAMUSCULAR; INTRAVENOUS at 22:26

## 2022-01-14 RX ADMIN — IPRATROPIUM BROMIDE AND ALBUTEROL SULFATE 3 ML: .5; 3 SOLUTION RESPIRATORY (INHALATION) at 22:20

## 2022-01-14 ASSESSMENT — ENCOUNTER SYMPTOMS
SORE THROAT: 1
SHORTNESS OF BREATH: 1
COUGH: 1
FEVER: 1

## 2022-01-15 VITALS
OXYGEN SATURATION: 94 % | TEMPERATURE: 100.3 F | HEART RATE: 97 BPM | SYSTOLIC BLOOD PRESSURE: 114 MMHG | RESPIRATION RATE: 10 BRPM | DIASTOLIC BLOOD PRESSURE: 72 MMHG

## 2022-01-15 LAB
FLUAV RNA SPEC QL NAA+PROBE: NEGATIVE
FLUBV RNA RESP QL NAA+PROBE: NEGATIVE
PROCALCITONIN SERPL-MCNC: <0.05 NG/ML
SARS-COV-2 RNA RESP QL NAA+PROBE: NEGATIVE

## 2022-01-15 RX ORDER — BENZONATATE 100 MG/1
100 CAPSULE ORAL 3 TIMES DAILY PRN
Qty: 15 CAPSULE | Refills: 0 | Status: SHIPPED | OUTPATIENT
Start: 2022-01-15

## 2022-01-15 RX ORDER — IPRATROPIUM BROMIDE AND ALBUTEROL SULFATE 2.5; .5 MG/3ML; MG/3ML
1 SOLUTION RESPIRATORY (INHALATION) EVERY 6 HOURS PRN
Qty: 90 ML | Refills: 0 | Status: SHIPPED | OUTPATIENT
Start: 2022-01-15

## 2022-01-15 RX ORDER — PREDNISONE 20 MG/1
TABLET ORAL
Qty: 10 TABLET | Refills: 0 | Status: SHIPPED | OUTPATIENT
Start: 2022-01-15

## 2022-01-15 NOTE — ED PROVIDER NOTES
History   Chief Complaint:  Shortness of Breath       The history is provided by the patient.      Vi Gee is a 67 year old female with history of COPD, sleep apnea, and borderline personality disorder who presents with shortness of breath. The patient first developed a sore throat on 5 days ago. She also has had worsening shortness of breath and cough since 2 days ago. Her shortness of breath worsens when she is laying down, and improves when she stands up. She has been measuring her oxygen saturation at home, and has been between 88 and 92%. He cough was initially productive with yellow sputum, but has since turned into a non-productive cough. Her temperature also has been as high 100.4 degrees. She was tested for Covid-19, but the tests are pending at this moment. She has been taking Tylenol to manage her fever with her last dose this morning. She also has been using her albuterol inhaler. She is vaccinated for Covid-19 and has received her booster. Despite her inhaler, she does not control her COPD at home.       Review of Systems   Constitutional: Positive for fever (Subjective).   HENT: Positive for sore throat.    Respiratory: Positive for cough and shortness of breath.    All other systems reviewed and are negative.      Allergies:  Celecoxib  Clindamycin  Epinephrine  Erythromycin    Medications:  Albuterol   Wellbutrin   Sinemet   Klonopin   Cymbalta   Gabapentin   Culturell   Ozempic   Requip   Desyrel     Past Medical History:     Anxiety   Borderline personality disorder   COPD (chronic obstructive pulmonary disease)   History of skin cancer   Hyperparathyroidism   AMELIA (obstructive sleep apnea)   Parkinson disease   Restless legs syndrome (RLS)  Pneumonia due to infectious organism, unspecified laterality, unspecified part of lung  Hypoxia  Atrial tachycardia   Borderline personality disorder  Hypovitaminosis D   Colonic polyps   Chronic diarrhea   Obesity   Osteopenia   HLD   Yeast vaginitis    Acute suppurative otitis media of right ear   Anxiety disorder   Pseudophakia  Temporomandibular joint disorder  Dysphagia  Sciatica  Intestinal infection due to Clostridium difficile  Atypical depressive disease  Paralysis agitans   Type 2 Diabetes mellitus   GERD   Salmonella  Stroke      Past Surgical History:    Appendectomy   Tonsillectomy    Tubal ligation   [place] teeth extraction   Cataract extraction   Parathyroidectomy   Bladder sling insertion   Skin cancer excision   Breast biopsy     Family History:    Father - lung cancer, jaw cancer, cataracts, depression, genetic disorder, IBS, macular degeneration  Mother - asthma, COPD,     Social History:  Patient presents to the ED alone via EMS    Physical Exam     Patient Vitals for the past 24 hrs:   BP Temp Temp src Pulse Resp SpO2   01/15/22 0030 -- -- -- 92 17 93 %   01/15/22 0015 (!) 141/80 -- -- 95 12 95 %   01/15/22 0000 118/72 -- -- 101 20 (!) 85 %   01/14/22 2227 (!) 150/76 -- -- -- 23 93 %   01/14/22 2135 (!) 174/120 100.3  F (37.9  C) Temporal 113 22 93 %       Physical Exam  Vitals reviewed.   HENT:      Head: Normocephalic.      Mouth/Throat:      Mouth: Mucous membranes are moist.   Eyes:      Pupils: Pupils are equal, round, and reactive to light.   Cardiovascular:      Rate and Rhythm: Normal rate.   Pulmonary:      Effort: Pulmonary effort is normal.      Breath sounds: Wheezing present.   Musculoskeletal:         General: Normal range of motion.   Skin:     General: Skin is warm.      Capillary Refill: Capillary refill takes less than 2 seconds.   Neurological:      General: No focal deficit present.      Mental Status: She is alert.   Psychiatric:         Mood and Affect: Mood is anxious.         Emergency Department Course   Imaging:  XR Chest Port 1 View  Final Result  IMPRESSION: Normal heart size. Lungs are clear. No pleural effusion or pneumothorax. Stable stimulator devices bilaterally. Stable postsurgical change in the right fifth  sixth and seventh ribs laterally.    Report per radiology    Laboratory:  Labs Ordered and Resulted from Time of ED Arrival to Time of ED Departure   BASIC METABOLIC PANEL - Abnormal       Result Value    Sodium 138      Potassium 4.2      Chloride 104      Carbon Dioxide (CO2) 30      Anion Gap 4      Urea Nitrogen 11      Creatinine 0.73      Calcium 9.0      Glucose 167 (*)     GFR Estimate 89     BLOOD GAS VENOUS - Abnormal    pH Venous 7.40      pCO2 Venous 52 (*)     pO2 Venous 67 (*)     Bicarbonate Venous 32 (*)     Base Excess/Deficit (+/-) 5.8 (*)     FIO2 21     INFLUENZA A/B & SARS-COV2 PCR MULTIPLEX - Normal    Influenza A PCR Negative      Influenza B PCR Negative      SARS CoV2 PCR Negative     CBC WITH PLATELETS AND DIFFERENTIAL    WBC Count 5.7      RBC Count 4.27      Hemoglobin 12.8      Hematocrit 40.4      MCV 95      MCH 30.0      MCHC 31.7      RDW 13.7      Platelet Count 207      % Neutrophils 68      % Lymphocytes 16      % Monocytes 8      % Eosinophils 6      % Basophils 1      % Immature Granulocytes 1      NRBCs per 100 WBC 0      Absolute Neutrophils 4.0      Absolute Lymphocytes 0.9      Absolute Monocytes 0.4      Absolute Eosinophils 0.3      Absolute Basophils 0.0      Absolute Immature Granulocytes 0.0      Absolute NRBCs 0.0        Emergency Department Course:    Reviewed:  I reviewed nursing notes, vitals, past medical history and Care Everywhere    Assessments:  2200 I obtained history and examined the patient as noted above.   2301 I rechecked the patient and updated.  0006 I rechecked the patient and explained findings. I discussed plan for discharge home.    Interventions:  2220 Duoneb 3 ml Neb   2226 Solu-medrol 125 mg IV   2226 Tylenol 650 mg PO    Disposition:  The patient was discharged to home.     Impression & Plan   Medical Decision Making:  Patient presents with shortness of breath and coughing.  Patient has a history of COPD and is actively wheezing.  Chest x-ray  shows no infiltrate and lab work is otherwise a normal without signs of CO2 retention.  Patient symptoms seem to improved after a DuoNeb as well as IV Solu-Medrol.  Patient was discussed for further work-up no need for antibiotics identified patient will be offered refill on inhalers as well as steroid treatment as a bridge to follow-up with primary care as well as cough medication.  COVID and influenza testing are pending at the time of discharge.    Diagnosis:    ICD-10-CM    1. COPD exacerbation (H)  J44.1        Discharge Medications:  New Prescriptions    BENZONATATE (TESSALON) 100 MG CAPSULE    Take 1 capsule (100 mg) by mouth 3 times daily as needed for cough    IPRATROPIUM - ALBUTEROL 0.5 MG/2.5 MG/3 ML (DUONEB) 0.5-2.5 (3) MG/3ML NEB SOLUTION    Take 1 vial (3 mLs) by nebulization every 6 hours as needed for shortness of breath / dyspnea or wheezing    PREDNISONE (DELTASONE) 20 MG TABLET    Take two tablets (= 40mg) each day for 5 (five) days       Scribe Disclosure:  I, Quoc Castro, am serving as a scribe at 9:44 PM on 1/14/2022 to document services personally performed by Calvin Sommers MD based on my observations and the provider's statements to me.        Calvin Sommers MD  01/1955

## 2022-01-15 NOTE — DISCHARGE INSTRUCTIONS
Your x-ray showed no pneumonia.  Your white blood cell count and electrolytes were normal.  You have improved with treating wheezing which is COPD.  Continue duo nebs or albuterol at home.  Use prednisone daily.  Use cough medication when needed.  Return to the emergency room with severe chest pain or severe increase in shortness of breath.  The hospital will call you if your COVID test is positive.

## 2022-01-15 NOTE — ED TRIAGE NOTES
Here for sob, cough, and fever x1 week, sob worsening toeday. Went to see her primary MD today with a pending Covid test. History of COPD. Took tylenol this morning, otherwise no other medications prior to arrival. ABCs intact.

## 2022-01-22 ENCOUNTER — APPOINTMENT (OUTPATIENT)
Dept: GENERAL RADIOLOGY | Facility: CLINIC | Age: 67
End: 2022-01-22
Attending: EMERGENCY MEDICINE
Payer: COMMERCIAL

## 2022-01-22 ENCOUNTER — HOSPITAL ENCOUNTER (EMERGENCY)
Facility: CLINIC | Age: 67
Discharge: HOME | End: 2022-01-22
Attending: EMERGENCY MEDICINE | Admitting: EMERGENCY MEDICINE
Payer: COMMERCIAL

## 2022-01-22 VITALS
SYSTOLIC BLOOD PRESSURE: 116 MMHG | TEMPERATURE: 98.5 F | DIASTOLIC BLOOD PRESSURE: 72 MMHG | OXYGEN SATURATION: 97 % | HEART RATE: 84 BPM | BODY MASS INDEX: 34.66 KG/M2 | HEIGHT: 65 IN | RESPIRATION RATE: 22 BRPM | WEIGHT: 208 LBS

## 2022-01-22 DIAGNOSIS — R07.9 CHEST PAIN, UNSPECIFIED TYPE: ICD-10-CM

## 2022-01-22 LAB
ALBUMIN SERPL-MCNC: 3.6 G/DL (ref 3.4–5)
ALP SERPL-CCNC: 82 U/L (ref 40–150)
ALT SERPL W P-5'-P-CCNC: 17 U/L (ref 0–50)
ANION GAP SERPL CALCULATED.3IONS-SCNC: 6 MMOL/L (ref 3–14)
AST SERPL W P-5'-P-CCNC: 13 U/L (ref 0–45)
BASOPHILS # BLD AUTO: 0.1 10E3/UL (ref 0–0.2)
BASOPHILS NFR BLD AUTO: 1 %
BILIRUB SERPL-MCNC: 0.5 MG/DL (ref 0.2–1.3)
BUN SERPL-MCNC: 15 MG/DL (ref 7–30)
CALCIUM SERPL-MCNC: 9 MG/DL (ref 8.5–10.1)
CHLORIDE BLD-SCNC: 101 MMOL/L (ref 94–109)
CO2 SERPL-SCNC: 30 MMOL/L (ref 20–32)
CREAT SERPL-MCNC: 0.91 MG/DL (ref 0.52–1.04)
D DIMER PPP FEU-MCNC: 0.32 UG/ML FEU (ref 0–0.5)
EOSINOPHIL # BLD AUTO: 0.3 10E3/UL (ref 0–0.7)
EOSINOPHIL NFR BLD AUTO: 3 %
ERYTHROCYTE [DISTWIDTH] IN BLOOD BY AUTOMATED COUNT: 13.6 % (ref 10–15)
GFR SERPL CREATININE-BSD FRML MDRD: 68 ML/MIN/1.73M2
GLUCOSE BLD-MCNC: 161 MG/DL (ref 70–99)
HCT VFR BLD AUTO: 47.2 % (ref 35–47)
HGB BLD-MCNC: 14.8 G/DL (ref 11.7–15.7)
HOLD SPECIMEN: NORMAL
IMM GRANULOCYTES # BLD: 0.3 10E3/UL
IMM GRANULOCYTES NFR BLD: 2 %
LYMPHOCYTES # BLD AUTO: 1.8 10E3/UL (ref 0.8–5.3)
LYMPHOCYTES NFR BLD AUTO: 16 %
MCH RBC QN AUTO: 29.9 PG (ref 26.5–33)
MCHC RBC AUTO-ENTMCNC: 31.4 G/DL (ref 31.5–36.5)
MCV RBC AUTO: 95 FL (ref 78–100)
MONOCYTES # BLD AUTO: 0.5 10E3/UL (ref 0–1.3)
MONOCYTES NFR BLD AUTO: 5 %
NEUTROPHILS # BLD AUTO: 8.5 10E3/UL (ref 1.6–8.3)
NEUTROPHILS NFR BLD AUTO: 73 %
NRBC # BLD AUTO: 0 10E3/UL
NRBC BLD AUTO-RTO: 0 /100
NT-PROBNP SERPL-MCNC: 18 PG/ML (ref 0–900)
PLATELET # BLD AUTO: 319 10E3/UL (ref 150–450)
POTASSIUM BLD-SCNC: 3.7 MMOL/L (ref 3.4–5.3)
PROT SERPL-MCNC: 7.6 G/DL (ref 6.8–8.8)
RBC # BLD AUTO: 4.95 10E6/UL (ref 3.8–5.2)
SARS-COV-2 RNA RESP QL NAA+PROBE: NEGATIVE
SODIUM SERPL-SCNC: 137 MMOL/L (ref 133–144)
TROPONIN I SERPL HS-MCNC: 3 NG/L
TROPONIN I SERPL HS-MCNC: 4 NG/L
WBC # BLD AUTO: 11.4 10E3/UL (ref 4–11)

## 2022-01-22 PROCEDURE — 258N000003 HC RX IP 258 OP 636: Performed by: EMERGENCY MEDICINE

## 2022-01-22 PROCEDURE — 84484 ASSAY OF TROPONIN QUANT: CPT | Performed by: EMERGENCY MEDICINE

## 2022-01-22 PROCEDURE — 85379 FIBRIN DEGRADATION QUANT: CPT | Performed by: EMERGENCY MEDICINE

## 2022-01-22 PROCEDURE — 250N000013 HC RX MED GY IP 250 OP 250 PS 637: Performed by: EMERGENCY MEDICINE

## 2022-01-22 PROCEDURE — 96360 HYDRATION IV INFUSION INIT: CPT

## 2022-01-22 PROCEDURE — 36415 COLL VENOUS BLD VENIPUNCTURE: CPT | Performed by: EMERGENCY MEDICINE

## 2022-01-22 PROCEDURE — 96361 HYDRATE IV INFUSION ADD-ON: CPT

## 2022-01-22 PROCEDURE — 83880 ASSAY OF NATRIURETIC PEPTIDE: CPT | Performed by: EMERGENCY MEDICINE

## 2022-01-22 PROCEDURE — 80053 COMPREHEN METABOLIC PANEL: CPT | Performed by: EMERGENCY MEDICINE

## 2022-01-22 PROCEDURE — 87635 SARS-COV-2 COVID-19 AMP PRB: CPT | Performed by: EMERGENCY MEDICINE

## 2022-01-22 PROCEDURE — 93005 ELECTROCARDIOGRAM TRACING: CPT

## 2022-01-22 PROCEDURE — 85025 COMPLETE CBC W/AUTO DIFF WBC: CPT | Performed by: EMERGENCY MEDICINE

## 2022-01-22 PROCEDURE — 71045 X-RAY EXAM CHEST 1 VIEW: CPT

## 2022-01-22 PROCEDURE — 99285 EMERGENCY DEPT VISIT HI MDM: CPT | Mod: 25

## 2022-01-22 PROCEDURE — C9803 HOPD COVID-19 SPEC COLLECT: HCPCS

## 2022-01-22 RX ORDER — ASPIRIN 81 MG/1
162 TABLET, CHEWABLE ORAL ONCE
Status: COMPLETED | OUTPATIENT
Start: 2022-01-22 | End: 2022-01-22

## 2022-01-22 RX ORDER — SODIUM CHLORIDE 9 MG/ML
INJECTION, SOLUTION INTRAVENOUS CONTINUOUS
Status: DISCONTINUED | OUTPATIENT
Start: 2022-01-22 | End: 2022-01-22 | Stop reason: HOSPADM

## 2022-01-22 RX ADMIN — SODIUM CHLORIDE 1000 ML: 9 INJECTION, SOLUTION INTRAVENOUS at 12:21

## 2022-01-22 RX ADMIN — ASPIRIN 81 MG CHEWABLE TABLET 162 MG: 81 TABLET CHEWABLE at 14:49

## 2022-01-22 RX ADMIN — SODIUM CHLORIDE: 9 INJECTION, SOLUTION INTRAVENOUS at 14:16

## 2022-01-22 ASSESSMENT — ENCOUNTER SYMPTOMS
NAUSEA: 0
FEVER: 0

## 2022-01-22 ASSESSMENT — MIFFLIN-ST. JEOR: SCORE: 1474.36

## 2022-01-22 NOTE — ED PROVIDER NOTES
History   Chief Complaint:  Chest Pain       HPI   Vi Gee is a 67 year old female with history of Parkinson's disease and COPD who presents with sudden centralized chest pain that started today. She felt like someone was sitting on her chest. This pain resolved on the ride here via EMS with not interventions. She has never had this before and was feeling fine yesterday. She denies nausea, fever, or Covid-19 symptoms.     Review of Systems   Constitutional: Negative for fever.   Cardiovascular: Positive for chest pain.   Gastrointestinal: Negative for nausea.   All other systems reviewed and are negative.        Allergies:  Celecoxib  Clindamycin  Epinephrine  Erythromycin    Medications:  Tessalon  Sinemet  Klonopin  Cymbalta  Neurontin  Culturell  Mycostatin  Deltasone  Requip  Desyrel    Past Medical History:     Anxiety  Borderline personality disorder  COPD  Skin cancer  Hyperparathyroidism  AMELIA  Parkinson disease  RLS  Acute respiratory failure with hypoxia   Pneumonia  Colon polyps    Past Surgical History:    Appendectomy  Endometrial ablation  Tonsillectomy  [place] wrist surgery  Transvaginal sling  Deep brain stimulator  Incontinence surgery   Breast biopsy    Family History:    Cancer  Cataract  Depression   Genetic disorder  IBS  Macular degeneration  Alcohol/drug abuse  Asthma  COPD  CAD  Dementia  Hypertension  Hearing loss  Osteoarthritis  Stroke    Social History:  The patient presents via EMS    Physical Exam     Patient Vitals for the past 24 hrs:   BP Temp Temp src Pulse Resp SpO2 Height Weight   01/22/22 1448 124/74 -- -- 80 -- 98 % -- --   01/22/22 1416 -- -- -- 91 -- 96 % -- --   01/22/22 1400 105/69 -- -- 91 -- 96 % -- --   01/22/22 1330 127/69 -- -- 97 -- 97 % -- --   01/22/22 1300 126/77 -- -- 93 -- 94 % -- --   01/22/22 1231 (!) 138/101 -- -- 106 -- 95 % -- --   01/22/22 1200 (!) 149/101 -- -- 114 -- 90 % -- --   01/22/22 1158 (!) 164/94 98.5  F (36.9  C) Oral 107 22 93 % 1.651 m (5'  "5\") 94.3 kg (208 lb)       Physical Exam  Vitals: reviewed by me  General: Pt seen on hospital [facility], cooperative, and alert to conversation  Eyes: Tracking well, clear conjunctiva BL  ENT: MMM, midline trachea.   Lungs: No tachypnea, no accessory muscle use. No respiratory distress.   CV: Rate as above  Abd: Soft, non tender, no guarding, no rebound. Non distended  Specifically no right upper quadrant tenderness palpation, no epigastric tenderness to palpation, negative Mayorga's.  MSK: no joint effusion.  No evidence of trauma  Skin: No rash  Neuro: Clear speech and no facial droop.  Moving all extremity spontaneously, following all commands  Psych: Not RIS, no e/o AH/VH      Emergency Department Course   ECG  ECG obtained at 1203, ECG read at 1205  Sinus tachycardia    Rate 111 bpm. KS interval 144 ms. QRS duration 74 ms. QT/QTc 320/435 ms. P-R-T axes 53 28 27.     Imaging:  XR Chest Port 1 View   Final Result   IMPRESSION: Negative chest.           Report per radiology    Laboratory:  Labs Ordered and Resulted from Time of ED Arrival to Time of ED Departure   COMPREHENSIVE METABOLIC PANEL - Abnormal       Result Value    Sodium 137      Potassium 3.7      Chloride 101      Carbon Dioxide (CO2) 30      Anion Gap 6      Urea Nitrogen 15      Creatinine 0.91      Calcium 9.0      Glucose 161 (*)     Alkaline Phosphatase 82      AST 13      ALT 17      Protein Total 7.6      Albumin 3.6      Bilirubin Total 0.5      GFR Estimate 68     CBC WITH PLATELETS AND DIFFERENTIAL - Abnormal    WBC Count 11.4 (*)     RBC Count 4.95      Hemoglobin 14.8      Hematocrit 47.2 (*)     MCV 95      MCH 29.9      MCHC 31.4 (*)     RDW 13.6      Platelet Count 319      % Neutrophils 73      % Lymphocytes 16      % Monocytes 5      % Eosinophils 3      % Basophils 1      % Immature Granulocytes 2      NRBCs per 100 WBC 0      Absolute Neutrophils 8.5 (*)     Absolute Lymphocytes 1.8      Absolute Monocytes 0.5      Absolute " Eosinophils 0.3      Absolute Basophils 0.1      Absolute Immature Granulocytes 0.3      Absolute NRBCs 0.0     TROPONIN I - Normal    Troponin I High Sensitivity 3     COVID-19 VIRUS (CORONAVIRUS) BY PCR - Normal    SARS CoV2 PCR Negative     NT PROBNP INPATIENT - Normal    N terminal Pro BNP Inpatient 18     D DIMER QUANTITATIVE - Normal    D-Dimer Quantitative 0.32     TROPONIN I - Normal    Troponin I High Sensitivity 4          Emergency Department Course:    Reviewed:  I reviewed nursing notes, vitals, past medical history and Care Everywhere    Assessments:  1205 I obtained history and examined the patient as noted above.     1507 I rechecked the patient and explained findings.     Consults:  1506 I spoke with the patient's  regarding updates and next steps    Interventions:  1221 NaCl Bolus 1,000 mL IV    1416 NaCl infusion IV    1449 Asprin 162 mg PO    Disposition:  The patient was discharged to home.     Impression & Plan     Medical Decision Making:  This is a pleasant 67-year-old female who presents to the emergency room with appears to be chest pain that occurred at rest earlier today.  The pain has gone away and the patient is asymptomatic here in the emergency room.  She has a normal chest x-ray, normal troponins x2, negative D-dimer, and a normal pulmonary exam.  She has a benign abdominal exam, and understands that no clear cause of her pain was found.  Because of this she needs to follow-up with her regular doctor within the next 5 days, for cardiac stress test and further evaluation.  A wide differential was considered, but I see no evidence of pneumothorax, MI, dissection, or pulmonary embolism.  Thankfully her labs are within normal limits here, and she is pain-free at time of discharge.  Aspirin was given out of an abundance of caution, and she can follow safely in the outpatient setting.  I spoke to her  John, who is also okay with this plan.    Diagnosis:    ICD-10-CM    1.  Chest pain, unspecified type  R07.9        Discharge Medications:  Discharge Medication List as of 1/22/2022  3:27 PM          Scribe Disclosure:  I, Fiona Lerma, am serving as a scribe at 11:54 AM on 1/22/2022 to document services personally performed by Jonathan Ching MD based on my observations and the provider's statements to me.          Jonathan Ching MD  01/22/22 2804

## 2022-01-22 NOTE — DISCHARGE INSTRUCTIONS
As we discussed, no clear cause of your chest pain was found today.  It is reassuring that your pain is gone here in the ER without any interventions, and also your lab work-ups are reassuring.  Your x-ray is unremarkable, and we do not see any evidence of a heart attack or blood clot.  Please come back to the ER immediately with any worsening symptoms, and be certain to follow with your regular doctor the next 5 days as you may need a cardiac stress test.  It is also extremely important you come back to the ER immediately if you have any return of your chest pain.

## 2022-01-22 NOTE — ED TRIAGE NOTES
Patient arrives via EMS for evaluation of chest pain. Reports sudden onset of central chest pain this morning at rest. Pain resolved en route, no medications administered. Patient has hx of Parkinsons and COPD, has neurostimulator which was turned off by  for EKG to be completed. No chest pain upon arrival. 02 92%@ on RA..

## 2022-01-24 LAB
ATRIAL RATE - MUSE: 111 BPM
DIASTOLIC BLOOD PRESSURE - MUSE: NORMAL MMHG
INTERPRETATION ECG - MUSE: NORMAL
P AXIS - MUSE: 53 DEGREES
PR INTERVAL - MUSE: 144 MS
QRS DURATION - MUSE: 74 MS
QT - MUSE: 320 MS
QTC - MUSE: 435 MS
R AXIS - MUSE: 28 DEGREES
SYSTOLIC BLOOD PRESSURE - MUSE: NORMAL MMHG
T AXIS - MUSE: 27 DEGREES
VENTRICULAR RATE- MUSE: 111 BPM

## 2022-04-01 ENCOUNTER — HOSPITAL ENCOUNTER (EMERGENCY)
Facility: CLINIC | Age: 67
Discharge: HOME | End: 2022-04-01
Attending: EMERGENCY MEDICINE | Admitting: EMERGENCY MEDICINE
Payer: COMMERCIAL

## 2022-04-01 ENCOUNTER — APPOINTMENT (OUTPATIENT)
Dept: GENERAL RADIOLOGY | Facility: CLINIC | Age: 67
End: 2022-04-01
Attending: EMERGENCY MEDICINE
Payer: COMMERCIAL

## 2022-04-01 ENCOUNTER — APPOINTMENT (OUTPATIENT)
Dept: CT IMAGING | Facility: CLINIC | Age: 67
End: 2022-04-01
Attending: EMERGENCY MEDICINE
Payer: COMMERCIAL

## 2022-04-01 VITALS
DIASTOLIC BLOOD PRESSURE: 70 MMHG | HEART RATE: 71 BPM | RESPIRATION RATE: 18 BRPM | TEMPERATURE: 97.8 F | OXYGEN SATURATION: 93 % | SYSTOLIC BLOOD PRESSURE: 110 MMHG

## 2022-04-01 DIAGNOSIS — K11.20 SIALADENITIS: ICD-10-CM

## 2022-04-01 DIAGNOSIS — R50.9 FEVER, UNSPECIFIED FEVER CAUSE: ICD-10-CM

## 2022-04-01 DIAGNOSIS — E04.2 NONTOXIC MULTINODULAR GOITER: ICD-10-CM

## 2022-04-01 LAB
ALBUMIN SERPL-MCNC: 3.4 G/DL (ref 3.4–5)
ALP SERPL-CCNC: 72 U/L (ref 40–150)
ALT SERPL W P-5'-P-CCNC: 9 U/L (ref 0–50)
ANION GAP SERPL CALCULATED.3IONS-SCNC: 1 MMOL/L (ref 3–14)
AST SERPL W P-5'-P-CCNC: 10 U/L (ref 0–45)
BASOPHILS # BLD AUTO: 0 10E3/UL (ref 0–0.2)
BASOPHILS NFR BLD AUTO: 0 %
BILIRUB SERPL-MCNC: 0.3 MG/DL (ref 0.2–1.3)
BUN SERPL-MCNC: 14 MG/DL (ref 7–30)
CALCIUM SERPL-MCNC: 9.3 MG/DL (ref 8.5–10.1)
CHLORIDE BLD-SCNC: 105 MMOL/L (ref 94–109)
CO2 SERPL-SCNC: 30 MMOL/L (ref 20–32)
CREAT SERPL-MCNC: 0.75 MG/DL (ref 0.52–1.04)
CRP SERPL-MCNC: 80.5 MG/L (ref 0–8)
DEPRECATED S PYO AG THROAT QL EIA: NEGATIVE
EOSINOPHIL # BLD AUTO: 0.1 10E3/UL (ref 0–0.7)
EOSINOPHIL NFR BLD AUTO: 1 %
ERYTHROCYTE [DISTWIDTH] IN BLOOD BY AUTOMATED COUNT: 12.8 % (ref 10–15)
FLUAV RNA SPEC QL NAA+PROBE: NEGATIVE
FLUBV RNA RESP QL NAA+PROBE: NEGATIVE
GFR SERPL CREATININE-BSD FRML MDRD: 86 ML/MIN/1.73M2
GLUCOSE BLD-MCNC: 146 MG/DL (ref 70–99)
GROUP A STREP BY PCR: NOT DETECTED
HCT VFR BLD AUTO: 40.5 % (ref 35–47)
HGB BLD-MCNC: 13 G/DL (ref 11.7–15.7)
IMM GRANULOCYTES # BLD: 0 10E3/UL
IMM GRANULOCYTES NFR BLD: 0 %
LYMPHOCYTES # BLD AUTO: 1.6 10E3/UL (ref 0.8–5.3)
LYMPHOCYTES NFR BLD AUTO: 17 %
MCH RBC QN AUTO: 29.3 PG (ref 26.5–33)
MCHC RBC AUTO-ENTMCNC: 32.1 G/DL (ref 31.5–36.5)
MCV RBC AUTO: 91 FL (ref 78–100)
MONOCYTES # BLD AUTO: 0.6 10E3/UL (ref 0–1.3)
MONOCYTES NFR BLD AUTO: 7 %
NEUTROPHILS # BLD AUTO: 6.7 10E3/UL (ref 1.6–8.3)
NEUTROPHILS NFR BLD AUTO: 75 %
NRBC # BLD AUTO: 0 10E3/UL
NRBC BLD AUTO-RTO: 0 /100
PLATELET # BLD AUTO: 257 10E3/UL (ref 150–450)
POTASSIUM BLD-SCNC: 3.8 MMOL/L (ref 3.4–5.3)
PROT SERPL-MCNC: 7.1 G/DL (ref 6.8–8.8)
RBC # BLD AUTO: 4.43 10E6/UL (ref 3.8–5.2)
SARS-COV-2 RNA RESP QL NAA+PROBE: NEGATIVE
SODIUM SERPL-SCNC: 136 MMOL/L (ref 133–144)
T4 FREE SERPL-MCNC: 1.11 NG/DL (ref 0.76–1.46)
TSH SERPL DL<=0.005 MIU/L-ACNC: 0.34 MU/L (ref 0.4–4)
WBC # BLD AUTO: 9 10E3/UL (ref 4–11)

## 2022-04-01 PROCEDURE — 36415 COLL VENOUS BLD VENIPUNCTURE: CPT | Performed by: EMERGENCY MEDICINE

## 2022-04-01 PROCEDURE — 250N000009 HC RX 250: Performed by: EMERGENCY MEDICINE

## 2022-04-01 PROCEDURE — 87651 STREP A DNA AMP PROBE: CPT | Performed by: EMERGENCY MEDICINE

## 2022-04-01 PROCEDURE — 85004 AUTOMATED DIFF WBC COUNT: CPT | Performed by: EMERGENCY MEDICINE

## 2022-04-01 PROCEDURE — 84439 ASSAY OF FREE THYROXINE: CPT | Performed by: EMERGENCY MEDICINE

## 2022-04-01 PROCEDURE — 86140 C-REACTIVE PROTEIN: CPT | Performed by: EMERGENCY MEDICINE

## 2022-04-01 PROCEDURE — 84443 ASSAY THYROID STIM HORMONE: CPT | Performed by: EMERGENCY MEDICINE

## 2022-04-01 PROCEDURE — 82947 ASSAY GLUCOSE BLOOD QUANT: CPT | Performed by: EMERGENCY MEDICINE

## 2022-04-01 PROCEDURE — 71046 X-RAY EXAM CHEST 2 VIEWS: CPT

## 2022-04-01 PROCEDURE — 70491 CT SOFT TISSUE NECK W/DYE: CPT

## 2022-04-01 PROCEDURE — 99285 EMERGENCY DEPT VISIT HI MDM: CPT | Mod: 25

## 2022-04-01 PROCEDURE — 87636 SARSCOV2 & INF A&B AMP PRB: CPT | Performed by: EMERGENCY MEDICINE

## 2022-04-01 PROCEDURE — 96375 TX/PRO/DX INJ NEW DRUG ADDON: CPT

## 2022-04-01 PROCEDURE — 250N000011 HC RX IP 250 OP 636: Performed by: EMERGENCY MEDICINE

## 2022-04-01 PROCEDURE — 96365 THER/PROPH/DIAG IV INF INIT: CPT | Mod: 59

## 2022-04-01 PROCEDURE — C9803 HOPD COVID-19 SPEC COLLECT: HCPCS

## 2022-04-01 RX ORDER — IOPAMIDOL 755 MG/ML
500 INJECTION, SOLUTION INTRAVASCULAR ONCE
Status: COMPLETED | OUTPATIENT
Start: 2022-04-01 | End: 2022-04-01

## 2022-04-01 RX ORDER — KETOROLAC TROMETHAMINE 15 MG/ML
15 INJECTION, SOLUTION INTRAMUSCULAR; INTRAVENOUS ONCE
Status: COMPLETED | OUTPATIENT
Start: 2022-04-01 | End: 2022-04-01

## 2022-04-01 RX ORDER — AMPICILLIN AND SULBACTAM 1; .5 G/1; G/1
1.5 INJECTION, POWDER, FOR SOLUTION INTRAMUSCULAR; INTRAVENOUS ONCE
Status: COMPLETED | OUTPATIENT
Start: 2022-04-01 | End: 2022-04-01

## 2022-04-01 RX ADMIN — AMPICILLIN AND SULBACTAM 1.5 G: 1; .5 INJECTION, POWDER, FOR SOLUTION INTRAVENOUS at 05:33

## 2022-04-01 RX ADMIN — SODIUM CHLORIDE 60 ML: 9 INJECTION, SOLUTION INTRAVENOUS at 04:16

## 2022-04-01 RX ADMIN — IOPAMIDOL 80 ML: 755 INJECTION, SOLUTION INTRAVENOUS at 04:16

## 2022-04-01 RX ADMIN — KETOROLAC TROMETHAMINE 15 MG: 15 INJECTION, SOLUTION INTRAMUSCULAR; INTRAVENOUS at 01:55

## 2022-04-01 ASSESSMENT — ENCOUNTER SYMPTOMS
COUGH: 1
FEVER: 1
SORE THROAT: 1
CHILLS: 1
NECK PAIN: 1

## 2022-04-01 NOTE — ED PROVIDER NOTES
History   Chief Complaint:  Neck Pain       HPI   Vi Gee is a 67 year old female with history of COPD, Parkinson's, stroke, and chronic pain syndrome who presents with neck pain. The patient developed neck pain last night as well as fever, chills, cough, throat pain, and right ear pain. She measured her temperature at 101 tonight at home. The patient is Covid-19 vaccinated.      Review of Systems   Constitutional: Positive for chills and fever.   HENT: Positive for ear pain and sore throat.    Respiratory: Positive for cough.    Musculoskeletal: Positive for neck pain.   All other systems reviewed and are negative.      Allergies:  Celecoxib  Clindamycin  Epinephrine  Erythromycin    Medications:  Albuterol  Benzonatate  Duoneb  Lactobacillus rhamnosus  Prednisone  Bupropion  Sinemet  Clonazepam  Duloxetine  Gabapentin  Nystatin  Ozempic  Ropinirole  Trazodone    Past Medical History:     Parkinson's disease  COPD  Anxiety  Skin cancer  Hyperparathyroidism  AMELIA  Restless legs syndrome  Acute respiratory failure with hypoxia  Personality disorder  Stroke  Chronic diarrhea  Hyperlipidemia  Colon polyps  Chronic pain syndrome  Heart murmur    Past Surgical History:    Appendectomy  Endometrial ablation  Tonsillectomy    Family History:    Mother: COPD, dementia, alcohol/drug abuse, asthma, lung cancer, cataract, CAD, hypertension, migraines, osteoarthritis, osteoporosis, stroke  Father: lung cancer, cataract, depression, inflammatory bowel disease  Sister: Parkinsonism    Social History:  Presents alone    Physical Exam     Patient Vitals for the past 24 hrs:   BP Temp Temp src Pulse Resp SpO2   04/01/22 0536 -- 97.8  F (36.6  C) Oral -- -- --   04/01/22 0535 110/70 -- -- 71 18 93 %   04/01/22 0129 (!) 162/77 99.8  F (37.7  C) Oral 112 20 98 %       Physical Exam  Vitals reviewed.   Constitutional:       Appearance: She is obese.   HENT:      Head: Normocephalic.      Right Ear: Tympanic membrane normal.       Left Ear: Tympanic membrane normal.      Nose: Nose normal.      Mouth/Throat:      Mouth: Mucous membranes are moist.   Neck:      Comments: There is obvious swelling at the base of the neck.  This seems to be a goiter there is prominence of the right lower neck.  Patient is mildly tender with palpation.  Cardiovascular:      Rate and Rhythm: Normal rate and regular rhythm.   Pulmonary:      Effort: Pulmonary effort is normal.      Breath sounds: Normal breath sounds.   Abdominal:      General: Abdomen is flat.      Palpations: Abdomen is soft.   Musculoskeletal:         General: Normal range of motion.   Skin:     General: Skin is warm.      Capillary Refill: Capillary refill takes less than 2 seconds.   Neurological:      Mental Status: She is alert. Mental status is at baseline.           Emergency Department Course     Imaging:  Chest XR,  PA & LAT   Final Result   IMPRESSION: Electronic devices over both the right and left upper chest with lead tips extending into both sides of the neck. Heart size and pulmonary vascularity within normal limits. No acute infiltrates or consolidation. Hypertrophic changes thoracic    spine. Plate and screw fixation multiple right-sided ribs. Otherwise unremarkable. No definite acute findings.                         Soft tissue neck CT w contrast   Final Result   IMPRESSION:    1.  No radiographic findings to suggest acute epiglottitis or tonsillitis.   2.  Markedly enlarged multinodular thyroid, as above. A dominant nodule within the right thyroid lobe measures up to 4.1 cm. Recommend follow-up thyroid ultrasound with consideration for soft tissue sampling.   3.  Question mild asymmetric enlargement and enhancement of the inferior left submandibular gland. This may be projection related, however, developing acute infectious/inflammatory submandibular gland sialoadenitis could have a similar appearance.    Clinical correlation is recommended.                           Report  per radiology    Laboratory:  Labs Ordered and Resulted from Time of ED Arrival to Time of ED Departure   COMPREHENSIVE METABOLIC PANEL - Abnormal       Result Value    Sodium 136      Potassium 3.8      Chloride 105      Carbon Dioxide (CO2) 30      Anion Gap 1 (*)     Urea Nitrogen 14      Creatinine 0.75      Calcium 9.3      Glucose 146 (*)     Alkaline Phosphatase 72      AST 10      ALT 9      Protein Total 7.1      Albumin 3.4      Bilirubin Total 0.3      GFR Estimate 86     CRP INFLAMMATION - Abnormal    CRP Inflammation 80.5 (*)    TSH WITH FREE T4 REFLEX - Abnormal    TSH 0.34 (*)    INFLUENZA A/B & SARS-COV2 PCR MULTIPLEX - Normal    Influenza A PCR Negative      Influenza B PCR Negative      SARS CoV2 PCR Negative     T4 FREE - Normal    Free T4 1.11     STREPTOCOCCUS A RAPID SCREEN W REFELX TO PCR - Normal    Group A Strep antigen Negative     CBC WITH PLATELETS AND DIFFERENTIAL    WBC Count 9.0      RBC Count 4.43      Hemoglobin 13.0      Hematocrit 40.5      MCV 91      MCH 29.3      MCHC 32.1      RDW 12.8      Platelet Count 257      % Neutrophils 75      % Lymphocytes 17      % Monocytes 7      % Eosinophils 1      % Basophils 0      % Immature Granulocytes 0      NRBCs per 100 WBC 0      Absolute Neutrophils 6.7      Absolute Lymphocytes 1.6      Absolute Monocytes 0.6      Absolute Eosinophils 0.1      Absolute Basophils 0.0      Absolute Immature Granulocytes 0.0      Absolute NRBCs 0.0     ROUTINE UA WITH MICROSCOPIC REFLEX TO CULTURE   GROUP A STREPTOCOCCUS PCR THROAT SWAB   BLOOD CULTURE   BLOOD CULTURE       Emergency Department Course:     Reviewed:  I reviewed nursing notes, vitals, past medical history and Care Everywhere    Assessments:  0141 I obtained history and examined the patient as noted above.    0248 I rechecked the patient and explained findings.    0502 I rechecked and updated the patient.    0504 I called and updated the patient's spouse,  John.    Interventions:  0155 Toradol 15 mg IV    0533 Unasyn 1.5 g IV    Disposition:  The patient was discharged to home.     Impression & Plan     Medical Decision Making:  Patient presents with fever and chills in the setting of her parkinsonian disease and some weak neck pain.  No clinical concerns for meningitis.  Due to pain and swelling in the neck CT was recommended for full assessment further fever work-up was unremarkable.  CT does not identify recurrent work multinodular goiter this is doubly doubtful the cause of fever.  Thyroid function tests are normal.  Due to recent biopsy the area felt obligated to treat with antibiotics due to pain and tenderness in this region the skin is not red or warm.  Patient will be offered antibiotics blood cultures are pending and was discharged home in stable condition.            Diagnosis:    ICD-10-CM    1. Fever, unspecified fever cause  R50.9    2. Nontoxic multinodular goiter  E04.2    3. Sialadenitis  K11.20        Discharge Medications:  Discharge Medication List as of 4/1/2022  6:14 AM      START taking these medications    Details   amoxicillin-clavulanate (AUGMENTIN) 875-125 MG tablet Take 1 tablet by mouth 2 times daily for 10 days, Disp-20 tablet, R-0, Local Print             Scribe Disclosure:  I, Christopher Hampton, am serving as a scribe at 1:40 AM on 4/1/2022 to document services personally performed by Calvin Sommers MD based on my observations and the provider's statements to me.         Calvin Sommers MD  04/02/22 0498

## 2022-04-01 NOTE — DISCHARGE INSTRUCTIONS
We have performed extensive testing for fever.  Currently cultures of blood are being performed but we recommend antibiotics due to recent history of thyroid biopsy and now fever.  Please complete course of Augmentin.  If you notice increased swelling or difficulty swallowing or increased shortness of breath or develop fever not responsive to Tylenol return to the emergency room for reassessment.

## 2022-04-01 NOTE — ED TRIAGE NOTES
Pt states fever, chills, neck pain and otalgia today. Pt is able to touch chin to chest in triage when asked. ABCs intact GCS 15

## 2022-06-06 ENCOUNTER — HOSPITAL ENCOUNTER (EMERGENCY)
Facility: CLINIC | Age: 67
Discharge: HOME | End: 2022-06-06
Attending: EMERGENCY MEDICINE | Admitting: EMERGENCY MEDICINE
Payer: COMMERCIAL

## 2022-06-06 ENCOUNTER — APPOINTMENT (OUTPATIENT)
Dept: GENERAL RADIOLOGY | Facility: CLINIC | Age: 67
End: 2022-06-06
Attending: EMERGENCY MEDICINE
Payer: COMMERCIAL

## 2022-06-06 VITALS
TEMPERATURE: 97.1 F | OXYGEN SATURATION: 99 % | DIASTOLIC BLOOD PRESSURE: 77 MMHG | SYSTOLIC BLOOD PRESSURE: 126 MMHG | HEART RATE: 72 BPM | RESPIRATION RATE: 18 BRPM

## 2022-06-06 DIAGNOSIS — R31.21 ASYMPTOMATIC MICROSCOPIC HEMATURIA: ICD-10-CM

## 2022-06-06 DIAGNOSIS — R53.83 FATIGUE, UNSPECIFIED TYPE: ICD-10-CM

## 2022-06-06 LAB
ALBUMIN UR-MCNC: NEGATIVE MG/DL
ANION GAP SERPL CALCULATED.3IONS-SCNC: 2 MMOL/L (ref 3–14)
APPEARANCE UR: CLEAR
BASOPHILS # BLD AUTO: 0 10E3/UL (ref 0–0.2)
BASOPHILS NFR BLD AUTO: 0 %
BILIRUB UR QL STRIP: NEGATIVE
BUN SERPL-MCNC: 14 MG/DL (ref 7–30)
CALCIUM SERPL-MCNC: 9 MG/DL (ref 8.5–10.1)
CHLORIDE BLD-SCNC: 104 MMOL/L (ref 94–109)
CO2 SERPL-SCNC: 32 MMOL/L (ref 20–32)
COLOR UR AUTO: YELLOW
CREAT SERPL-MCNC: 0.74 MG/DL (ref 0.52–1.04)
EOSINOPHIL # BLD AUTO: 0.2 10E3/UL (ref 0–0.7)
EOSINOPHIL NFR BLD AUTO: 2 %
ERYTHROCYTE [DISTWIDTH] IN BLOOD BY AUTOMATED COUNT: 13.6 % (ref 10–15)
GFR SERPL CREATININE-BSD FRML MDRD: 88 ML/MIN/1.73M2
GLUCOSE BLD-MCNC: 93 MG/DL (ref 70–99)
GLUCOSE UR STRIP-MCNC: NEGATIVE MG/DL
HCT VFR BLD AUTO: 43.6 % (ref 35–47)
HGB BLD-MCNC: 13.8 G/DL (ref 11.7–15.7)
HGB UR QL STRIP: ABNORMAL
IMM GRANULOCYTES # BLD: 0 10E3/UL
IMM GRANULOCYTES NFR BLD: 1 %
KETONES UR STRIP-MCNC: NEGATIVE MG/DL
LEUKOCYTE ESTERASE UR QL STRIP: NEGATIVE
LYMPHOCYTES # BLD AUTO: 1.8 10E3/UL (ref 0.8–5.3)
LYMPHOCYTES NFR BLD AUTO: 22 %
MCH RBC QN AUTO: 29.5 PG (ref 26.5–33)
MCHC RBC AUTO-ENTMCNC: 31.7 G/DL (ref 31.5–36.5)
MCV RBC AUTO: 93 FL (ref 78–100)
MONOCYTES # BLD AUTO: 0.4 10E3/UL (ref 0–1.3)
MONOCYTES NFR BLD AUTO: 5 %
MUCOUS THREADS #/AREA URNS LPF: PRESENT /LPF
NEUTROPHILS # BLD AUTO: 5.6 10E3/UL (ref 1.6–8.3)
NEUTROPHILS NFR BLD AUTO: 70 %
NITRATE UR QL: NEGATIVE
NRBC # BLD AUTO: 0 10E3/UL
NRBC BLD AUTO-RTO: 0 /100
PH UR STRIP: 6.5 [PH] (ref 5–7)
PLATELET # BLD AUTO: 291 10E3/UL (ref 150–450)
POTASSIUM BLD-SCNC: 4 MMOL/L (ref 3.4–5.3)
RBC # BLD AUTO: 4.68 10E6/UL (ref 3.8–5.2)
RBC URINE: 122 /HPF
SODIUM SERPL-SCNC: 138 MMOL/L (ref 133–144)
SP GR UR STRIP: 1.02 (ref 1–1.03)
SQUAMOUS EPITHELIAL: <1 /HPF
TROPONIN I SERPL HS-MCNC: <3 NG/L
UROBILINOGEN UR STRIP-MCNC: NORMAL MG/DL
WBC # BLD AUTO: 8 10E3/UL (ref 4–11)
WBC URINE: 1 /HPF

## 2022-06-06 PROCEDURE — 85025 COMPLETE CBC W/AUTO DIFF WBC: CPT | Performed by: EMERGENCY MEDICINE

## 2022-06-06 PROCEDURE — 71046 X-RAY EXAM CHEST 2 VIEWS: CPT

## 2022-06-06 PROCEDURE — 81001 URINALYSIS AUTO W/SCOPE: CPT | Performed by: EMERGENCY MEDICINE

## 2022-06-06 PROCEDURE — 96360 HYDRATION IV INFUSION INIT: CPT

## 2022-06-06 PROCEDURE — 80048 BASIC METABOLIC PNL TOTAL CA: CPT | Performed by: EMERGENCY MEDICINE

## 2022-06-06 PROCEDURE — 99285 EMERGENCY DEPT VISIT HI MDM: CPT | Mod: 25

## 2022-06-06 PROCEDURE — 36415 COLL VENOUS BLD VENIPUNCTURE: CPT | Performed by: EMERGENCY MEDICINE

## 2022-06-06 PROCEDURE — 258N000003 HC RX IP 258 OP 636: Performed by: EMERGENCY MEDICINE

## 2022-06-06 PROCEDURE — 84484 ASSAY OF TROPONIN QUANT: CPT | Performed by: EMERGENCY MEDICINE

## 2022-06-06 PROCEDURE — 96361 HYDRATE IV INFUSION ADD-ON: CPT

## 2022-06-06 PROCEDURE — 93005 ELECTROCARDIOGRAM TRACING: CPT

## 2022-06-06 PROCEDURE — 99284 EMERGENCY DEPT VISIT MOD MDM: CPT | Mod: 25

## 2022-06-06 RX ADMIN — SODIUM CHLORIDE 1000 ML: 9 INJECTION, SOLUTION INTRAVENOUS at 14:53

## 2022-06-06 ASSESSMENT — ENCOUNTER SYMPTOMS
SHORTNESS OF BREATH: 0
ABDOMINAL PAIN: 0
LIGHT-HEADEDNESS: 1

## 2022-06-06 NOTE — ED TRIAGE NOTES
"    Presents to ED from  because \"I have been feeling icky and off for quite sometime.\" Pt reports taking blood sugar at home, which was 180, \"that is not normal for me.\" Not a known diabetic. H/o brain stimulators and parkinson's.  present with pt, states she is at baseline. VSS. \"I have been cranky. My short term memory is getting worse everyday.\" ABC's intact. A/Ox4.   "

## 2022-06-06 NOTE — ED PROVIDER NOTES
History   Chief Complaint:  Fatigue    The history is provided by the patient. History limited by: poor historian.      Vi Gee is a 67 year old female with history of Parkinson's disease, COPD, hyperparathyroidism, atrial tachycardia, hyperlipidemia, hypercholesteremia, type II diabetes, and stroke who presents with fatigue.  Per triage note, patient presents to ED from urgent care. States she has been experiencing difficulties with her memory and has been feeling lightheaded.  Patient reports she has Parkinson's disease and that she has been on medication for 32 years.  Denies chest pain, shortness of breath, and abdominal pain.     Review of Systems   Respiratory: Negative for shortness of breath.    Cardiovascular: Negative for chest pain.   Gastrointestinal: Negative for abdominal pain.   Neurological: Positive for light-headedness.   All other systems reviewed and are negative.    Allergies:  Celecoxib  Clindamycin  Epinephrine  Erythromycin    Medications:  Valacyclovir   Tizanidine  Trazodone  Ropinirole  Albuterol  Gabapentin  Bupropion   Duloxetine   Klonopin  Carbidopa-levodopa      Past Medical History:     Organic affective syndrome   Atypical parkinsonism  Chronic pain syndrome  COPD  Anxiety  Borderline personality disorder  Skin cancer  Hyperparathyroidism  AMELIA  RLS  IBS  Atrial tachycardia, paroxysmal  Vitamin D deficiency  Colonic polyps  Chronic diarrhea  Osteopenia  Hyperlipidemia  Paralysis agitans  Depression  Hypercholesteremia  Vitreous degeneration  Type II diabetes  Mastoiditis  Insomnia   Memory loss  Stroke  Liver disease  Heart murmur  TMJ disorder    Past Surgical History:    Appendectomy  Uterine tube ligation  Tonsillectomy and adenoidectomy  [place] teeth extraction  Parathyroidectomy  Deep brain stimulator placement  Skin cancer excision  Incontinence surgery  Endometrial ablation    Bilateral cataract surgery with IOL  Breast biopsy    Family History:    Father- lung cancer,  jaw cancer, cataract, depression, genetic disorder, IBS, macular degeneration  Mother- alcohol/drug use, asthma, COPD, lung cancer, cataract, dementia, depression, CAD, hearing loss, hypertension, migraines, osteoarthritis, osteoporosis, stroke  Sister- migraines    Social History:  Presents alone  Presents via private vehicle    Physical Exam     Patient Vitals for the past 24 hrs:   BP Temp Temp src Pulse Resp SpO2   06/06/22 1709 126/77 -- -- 72 18 99 %   06/06/22 1700 -- -- -- -- -- 96 %   06/06/22 1645 -- -- -- -- -- 96 %   06/06/22 1630 126/77 -- -- 69 -- 97 %   06/06/22 1515 114/68 -- -- 69 -- 92 %   06/06/22 1500 117/69 -- -- 70 -- 93 %   06/06/22 1319 115/79 97.1  F (36.2  C) Temporal 69 18 95 %     Physical Exam  General/Appearance: appears stated age, well-groomed, appears comfortable  Eyes: EOMI, no scleral injection, no icterus  ENT: MMM  Neck: supple, nl ROM, no stiffness  Cardiovascular: RRR, nl S1S2, no m/r/g, 2+ pulses in all 4 extremities, cap refill <2sec  Respiratory: CTAB, good air movement throughout, no wheezes/rhonchi/rales, no increased WOB, no retractions  GI: abd soft, non-distended, nttp,  no HSM, no rebound, no guarding, nl BS  MSK: VICTORIA, good tone, no bony abnormality  Skin: warm and well-perfused, no rash, no edema, no ecchymosis, nl turgor  Neuro: GCS 15, alert and oriented, no gross focal neuro deficits, ambulatory, tremors to UE  Psych: anxious affect  Heme: no petechia, no purpura, no active bleeding    Emergency Department Course   ECG  ECG taken at 1441, ECG read at 1449  Normal sinus rhythm   Rate 71 bpm. WI interval 158 ms. QRS duration 82 ms. QT/QTc 418/454 ms. P-R-T axes 52 19 17.     Imaging:  XR Chest 2 Views   Final Result   IMPRESSION: No radiographic evidence of acute chest abnormality.       CHRISTOPH CRUZ MD            SYSTEM ID:  [id number]        Report per radiology    Laboratory:  Labs Ordered and Resulted from Time of ED Arrival to Time of ED Departure   BASIC  METABOLIC PANEL - Abnormal       Result Value    Sodium 138      Potassium 4.0      Chloride 104      Carbon Dioxide (CO2) 32      Anion Gap 2 (*)     Urea Nitrogen 14      Creatinine 0.74      Calcium 9.0      Glucose 93      GFR Estimate 88     ROUTINE UA WITH MICROSCOPIC REFLEX TO CULTURE - Abnormal    Color Urine Yellow      Appearance Urine Clear      Glucose Urine Negative      Bilirubin Urine Negative      Ketones Urine Negative      Specific Gravity Urine 1.019      Blood Urine Moderate (*)     pH Urine 6.5      Protein Albumin Urine Negative      Urobilinogen Urine Normal      Nitrite Urine Negative      Leukocyte Esterase Urine Negative      Mucus Urine Present (*)     RBC Urine 122 (*)     WBC Urine 1      Squamous Epithelials Urine <1     TROPONIN I - Normal    Troponin I High Sensitivity <3     CBC WITH PLATELETS AND DIFFERENTIAL    WBC Count 8.0      RBC Count 4.68      Hemoglobin 13.8      Hematocrit 43.6      MCV 93      MCH 29.5      MCHC 31.7      RDW 13.6      Platelet Count 291      % Neutrophils 70      % Lymphocytes 22      % Monocytes 5      % Eosinophils 2      % Basophils 0      % Immature Granulocytes 1      NRBCs per 100 WBC 0      Absolute Neutrophils 5.6      Absolute Lymphocytes 1.8      Absolute Monocytes 0.4      Absolute Eosinophils 0.2      Absolute Basophils 0.0      Absolute Immature Granulocytes 0.0      Absolute NRBCs 0.0       Emergency Department Course:     Reviewed:  I reviewed nursing notes, vitals, past medical history and Care Everywhere    Assessments:  1421 I obtained history and examined the patient as noted above.   1633 I rechecked and updated the patient.  1653 I rechecked and updated the patient.    Interventions:  1453 0.9% NS Bolus, 1000 mL, IV    Disposition:  The patient was discharged to home.     Impression & Plan     Medical Decision Making:  This patient is a pleasant 67-year-old female with history of early onset Parkinson's disease, COPD, type 2 diabetes  who presents with generalized fatigue and feeling like her memory has been worse than usual.  She denies any other symptoms including fevers, chest pain, headaches, shortness of breath, belly pain, urinary symptoms.  Labs here are unremarkable.  She is got some parkinsonian tremors but otherwise is neurologically intact.  She denies symptoms consistent with intracranial bleed/tumor.  I do not think advanced imaging is necessary.  Urinalysis here shows some blood in the urine but otherwise unremarkable.  As I explained to her and her family member I am not sure what is causing her symptoms but I think we have considered life-threatening causes and feel these are unlikely.  I think it is reasonable that she be discharged home to follow-up with her PCP.    Diagnosis:    ICD-10-CM    1. Fatigue, unspecified type  R53.83    2. Asymptomatic microscopic hematuria  R31.21      Scribe Disclosure:  I, Josie Chaney, am serving as a scribe at 2:18 PM on 6/6/2022 to document services personally performed by Merry Garcia MD based on my observations and the provider's statements to me.     I, Jose Guadalupe Segovia, am serving as a scribe  on 6/6/2022 at 5:16 PM to document services personally performed by Merry Garcia MD based on my observations and the provider's statements to me.          Merry Garcia MD  06/06/22 9309

## 2022-06-07 LAB
ATRIAL RATE - MUSE: 71 BPM
DIASTOLIC BLOOD PRESSURE - MUSE: NORMAL MMHG
INTERPRETATION ECG - MUSE: NORMAL
P AXIS - MUSE: 52 DEGREES
PR INTERVAL - MUSE: 158 MS
QRS DURATION - MUSE: 82 MS
QT - MUSE: 418 MS
QTC - MUSE: 454 MS
R AXIS - MUSE: 19 DEGREES
SYSTOLIC BLOOD PRESSURE - MUSE: NORMAL MMHG
T AXIS - MUSE: 17 DEGREES
VENTRICULAR RATE- MUSE: 71 BPM

## 2022-07-06 ENCOUNTER — HOSPITAL ENCOUNTER (EMERGENCY)
Facility: CLINIC | Age: 67
Discharge: HOME | End: 2022-07-06
Attending: EMERGENCY MEDICINE | Admitting: EMERGENCY MEDICINE
Payer: COMMERCIAL

## 2022-07-06 ENCOUNTER — APPOINTMENT (OUTPATIENT)
Dept: CT IMAGING | Facility: CLINIC | Age: 67
End: 2022-07-06
Attending: EMERGENCY MEDICINE
Payer: COMMERCIAL

## 2022-07-06 VITALS
HEART RATE: 79 BPM | TEMPERATURE: 98.3 F | OXYGEN SATURATION: 90 % | DIASTOLIC BLOOD PRESSURE: 97 MMHG | SYSTOLIC BLOOD PRESSURE: 128 MMHG | RESPIRATION RATE: 19 BRPM

## 2022-07-06 DIAGNOSIS — E27.9 ADRENAL NODULE (H): ICD-10-CM

## 2022-07-06 DIAGNOSIS — S80.211A ABRASION, RIGHT KNEE, INITIAL ENCOUNTER: ICD-10-CM
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
## 2022-07-06 DIAGNOSIS — E04.1 THYROID NODULE: ICD-10-CM

## 2022-07-06 LAB
ALBUMIN SERPL-MCNC: 3.7 G/DL (ref 3.4–5)
ALP SERPL-CCNC: 81 U/L (ref 40–150)
ALT SERPL W P-5'-P-CCNC: 13 U/L (ref 0–50)
ANION GAP SERPL CALCULATED.3IONS-SCNC: 4 MMOL/L (ref 3–14)
AST SERPL W P-5'-P-CCNC: 15 U/L (ref 0–45)
BILIRUB SERPL-MCNC: 0.3 MG/DL (ref 0.2–1.3)
BUN SERPL-MCNC: 17 MG/DL (ref 7–30)
CALCIUM SERPL-MCNC: 8.8 MG/DL (ref 8.5–10.1)
CHLORIDE BLD-SCNC: 106 MMOL/L (ref 94–109)
CO2 SERPL-SCNC: 30 MMOL/L (ref 20–32)
CREAT SERPL-MCNC: 0.68 MG/DL (ref 0.52–1.04)
ERYTHROCYTE [DISTWIDTH] IN BLOOD BY AUTOMATED COUNT: 14.6 % (ref 10–15)
ETHANOL SERPL-MCNC: <0.01 G/DL
GFR SERPL CREATININE-BSD FRML MDRD: >90 ML/MIN/1.73M2
GLUCOSE BLD-MCNC: 136 MG/DL (ref 70–99)
HCT VFR BLD AUTO: 39.9 % (ref 35–47)
HGB BLD-MCNC: 12.6 G/DL (ref 11.7–15.7)
HOLD SPECIMEN: NORMAL
MCH RBC QN AUTO: 29.4 PG (ref 26.5–33)
MCHC RBC AUTO-ENTMCNC: 31.6 G/DL (ref 31.5–36.5)
MCV RBC AUTO: 93 FL (ref 78–100)
PLATELET # BLD AUTO: 256 10E3/UL (ref 150–450)
POTASSIUM BLD-SCNC: 3.9 MMOL/L (ref 3.4–5.3)
PROT SERPL-MCNC: 7.3 G/DL (ref 6.8–8.8)
RBC # BLD AUTO: 4.29 10E6/UL (ref 3.8–5.2)
SARS-COV-2 RNA RESP QL NAA+PROBE: NEGATIVE
SODIUM SERPL-SCNC: 140 MMOL/L (ref 133–144)
WBC # BLD AUTO: 9.5 10E3/UL (ref 4–11)

## 2022-07-06 PROCEDURE — U0005 INFEC AGEN DETEC AMPLI PROBE: HCPCS | Performed by: EMERGENCY MEDICINE

## 2022-07-06 PROCEDURE — 82077 ASSAY SPEC XCP UR&BREATH IA: CPT | Performed by: EMERGENCY MEDICINE

## 2022-07-06 PROCEDURE — 250N000009 HC RX 250: Performed by: EMERGENCY MEDICINE

## 2022-07-06 PROCEDURE — 85027 COMPLETE CBC AUTOMATED: CPT | Performed by: EMERGENCY MEDICINE

## 2022-07-06 PROCEDURE — 74177 CT ABD & PELVIS W/CONTRAST: CPT

## 2022-07-06 PROCEDURE — C9803 HOPD COVID-19 SPEC COLLECT: HCPCS

## 2022-07-06 PROCEDURE — 70450 CT HEAD/BRAIN W/O DYE: CPT

## 2022-07-06 PROCEDURE — 250N000013 HC RX MED GY IP 250 OP 250 PS 637: Performed by: EMERGENCY MEDICINE

## 2022-07-06 PROCEDURE — 250N000011 HC RX IP 250 OP 636: Performed by: EMERGENCY MEDICINE

## 2022-07-06 PROCEDURE — 99285 EMERGENCY DEPT VISIT HI MDM: CPT | Mod: 25

## 2022-07-06 PROCEDURE — 36415 COLL VENOUS BLD VENIPUNCTURE: CPT | Performed by: EMERGENCY MEDICINE

## 2022-07-06 PROCEDURE — 80053 COMPREHEN METABOLIC PANEL: CPT | Performed by: EMERGENCY MEDICINE

## 2022-07-06 RX ORDER — LIDOCAINE 50 MG/G
1 PATCH TOPICAL EVERY 24 HOURS
Qty: 10 PATCH | Refills: 0 | Status: SHIPPED | OUTPATIENT
Start: 2022-07-06 | End: 2022-07-16

## 2022-07-06 RX ORDER — IOPAMIDOL 755 MG/ML
500 INJECTION, SOLUTION INTRAVASCULAR ONCE
Status: COMPLETED | OUTPATIENT
Start: 2022-07-06 | End: 2022-07-06

## 2022-07-06 RX ORDER — LIDOCAINE 4 G/G
1 PATCH TOPICAL ONCE
Status: DISCONTINUED | OUTPATIENT
Start: 2022-07-06 | End: 2022-07-06 | Stop reason: HOSPADM

## 2022-07-06 RX ADMIN — IOPAMIDOL 90 ML: 755 INJECTION, SOLUTION INTRAVENOUS at 18:55

## 2022-07-06 RX ADMIN — SODIUM CHLORIDE 65 ML: 9 INJECTION, SOLUTION INTRAVENOUS at 18:55

## 2022-07-06 RX ADMIN — LIDOCAINE 1 PATCH: 246 PATCH TOPICAL at 18:27

## 2022-07-06 ASSESSMENT — ENCOUNTER SYMPTOMS
FEVER: 0
WOUND: 1
ABDOMINAL PAIN: 1
SHORTNESS OF BREATH: 0
CHILLS: 0
COUGH: 0
FLANK PAIN: 1

## 2022-07-06 NOTE — ED PROVIDER NOTES
History   Chief Complaint:  Rib Pain    The history is provided by the patient and the spouse.      Vi Gee is a 67 year old female with history of COPD and fall with right-sided rib fractures 2 years ago who presents with rib pain and leg abrasions due to fall. Pt reports she fell on a treadmill today around noon and that is when the pain began. She reports pain is similar to when she broke multiple right-sided ribs requiring surgery 2 summers ago. She denies pain medications here and states she didn't take pain medication prior to arrival. Tetanus up to date 12/16/21.     Review of Systems   Constitutional: Negative for chills and fever.   Respiratory: Negative for cough and shortness of breath.    Cardiovascular: Positive for chest pain.   Gastrointestinal: Positive for abdominal pain.   Genitourinary: Positive for flank pain.   Skin: Positive for wound.   All other systems reviewed and are negative.    Allergies:  Celecoxib  Clindamycin  Epinephrine  Erythromycin    Medications:  Sinemet  Rytary  Requip  Desyrel  Wellbutrin  Duoneb  Deltasone  Klonopin  Cymbalta  Tessalon    Past Medical History:     Acute respiratory failure  Hypoxia  Pneumonia   Anxiety  Dementia due to Parkinson's disease  Behavioral disturbance  COPD  Hyperparathyroidism  Restless leg syndrome  IBS  Atrial tachycardia  BPD  Dysphagia  Colonic polyps  Genital prolapse  AMELIA  Obesity  Hyperlipidemia  Hypokalemia  Paralysis agitans  Adhesive capsulitis of shoulder  Parkinson's disease  Stroke  Skin cancer  MDD  Personality disorder  Pseudophakia  TMJ  Dysphagia  Sciatica  Depression   Anxiety  Mastoiditis  Tonsillitis  GERD  Urinary incontinence  Arrhythmia  Sleep apnea  Gum disease  Perforated tympanic membrane    Past Surgical History:    Appendectomy  Adenoidectomy  Tonsillectomy  Endometrial ablation  Forearm/wrist procedure  IA LAP ablation uterine fibroids  LIG fallopian tube abd/vag uni/juanjose  Obstetrical care x3  Scope wrist  sturgery  Transvaginal sling  Prophylaxis ret detach  Replacement deep brain stimulator generator  Parathyroidectomy  Deep brain stimulator placement  Skin cancer excision  Breast biopsy    Family History:    Mother: COPD, dementia  Sister: Parkinsonism    Social History:  The patient presents to the ED with , Huy    Physical Exam     Patient Vitals for the past 24 hrs:   BP Temp Temp src Pulse Resp SpO2   07/06/22 1545 (!) 144/82 98.3  F (36.8  C) Temporal 100 20 97 %     Physical Exam  Nursing note and vitals reviewed.  HENT:   Mouth/Throat: Moist mucous membranes.   Eyes: EOMI, nonicteric sclera  Cardiovascular: Normal rate, regular rhythm, no murmurs, rubs, or gallops  Pulmonary/Chest: Effort normal and breath sounds normal. No respiratory distress. No wheezes. No rales. Pain to palpation right lower anterior chest. No bruising/crepitus noted.   Abdominal: Soft. TTP RUQ, nondistended, no guarding or rigidity.   Musculoskeletal: Normal range of motion.  No midline C/T/L-spine tenderness.  Neurological: Alert. Slightly confused with GCS 14. Moves all extremities spontaneously.   Skin: Skin is warm and dry.  Abrasion noted to right anterior knee.      Emergency Department Course     Imaging:  CT Chest/Abdomen/Pelvis w Contrast    (Results Pending)   CT Head w/o Contrast    (Results Pending)     Report per radiology    Laboratory:  Labs Ordered and Resulted from Time of ED Arrival to Time of ED Departure   COMPREHENSIVE METABOLIC PANEL - Abnormal       Result Value    Sodium 140      Potassium 3.9      Chloride 106      Carbon Dioxide (CO2) 30      Anion Gap 4      Urea Nitrogen 17      Creatinine 0.68      Calcium 8.8      Glucose 136 (*)     Alkaline Phosphatase 81      AST 15      ALT 13      Protein Total 7.3      Albumin 3.7      Bilirubin Total 0.3      GFR Estimate >90     ETHYL ALCOHOL LEVEL - Normal    Alcohol ethyl <0.01     CBC WITH PLATELETS AND DIFFERENTIAL     Emergency Department Course:      Reviewed:  I reviewed nursing notes, vitals, past medical history and Care Everywhere    Assessments:  1647 I obtained history and examined the patient as noted above.     Interventions:  Medications   Lidocaine (LIDOCARE) 4 % Patch 1 patch (1 patch Transdermal Patch/Med Applied 7/6/22 1827)     Disposition:  Care of the patient was transferred to my colleague Dr. Guevara pending CT, labs.     Impression & Plan     Medical Decision Making:  Patient presents with right-sided rib pain after a fall.  She reports pain is similar to fall 2 years ago that resulted in multiple right-sided rib fractures that ultimately required surgery.  On exam, she is satting 97 to 99% on room air.  Normal heart rate.  She has equal breath sounds on both sides.  There is no crepitus.  There is no evidence of tension pneumothorax at this time, therefore I believe it is safe to await CT imaging.  This is pending at time of signout.  Patient declined pain medication here, though seemed slightly confused.   reports that she is at her baseline.  Anticipate admission if patient is found to have multiple broken ribs.  Care/disposition signed out to Dr. Guevara at routine end of shift signout.    Diagnosis:  Fall  Right knee abrasion  Right-sided chest pain    Scribe Disclosure:  I, Georgie Tamidori, am serving as a scribe at 4:44 PM on 7/6/2022 to document services personally performed by Dixon Clarke MD based on my observations and the provider's statements to me.            Dixon Clarke MD  07/06/22 8812

## 2022-07-07 NOTE — DISCHARGE INSTRUCTIONS
Discharge Instructions  Trauma    You were seen today for an injury due to some kind of trauma (crash, fall, etc.).  Some injuries may not show up until after you leave the Emergency Department.  It is important that you pay attention to these instructions and follow-up with your regular doctor as instructed.    Return to the Emergency Department right away if:  You have abdominal pain or bruises, chest pain, pain in a new area, or pain that is getting worse.  You get short of breath.  You develop a fever over 101 degrees.  You have weakness in your arms or legs.  You faint or you are very lightheaded.  You have any new symptoms, you are feeling weak or unusually ill, or something worries you.   Injuries to the brain are possible with any accident.  Return right away if you have confusion, vomiting more than once, difficulty walking or a headache that is getting worse. Bring a child or a person who can t talk back if they seem to be behaving in an abnormal way.      MORE INFORMATION:    General Injuries:  Aches and pains are usually worse the day after your accident, but should not be severe, and should start getting better after that. Aches and pains are common in the neck and back.  Injuries from your accident may prevent you from working.  Follow-up with your regular doctor to get a work note and to find out how long you will not be able to work.  Pain medications or your injuries may make it unsafe for you to drive or operate machinery.  Use ice to injured areas for the first one or two days. Apply a bag of ice wrapped in a cloth for about 15 minutes at a time. You can do this as often as once an hour. Do not sleep with an ice pack, since it can burn you.   You can use non-prescription pain medicine, like Tylenol  (acetaminophen), Advil  (ibuprofen), Motrin  (ibuprofen), Nuprin  (ibuprofen) if your emergency doctor or your own doctor told you this is okay. Tylenol  (acetaminophen) is in many prescription  medicines and non-prescription medicines--check all of your medicines to be sure you aren t taking more than 3000 mg per day.  Limit your activity for at least one or two days. Avoid doing things that hurt.  You need to see your doctor if any injured area is not back to normal in 1 week.    Car Accident:  If you have been on a backboard or had a neck collar on, this may make you stiff and sore.  This should get better in 1-2 days.  Return to the Emergency Department if the pain or discomfort is severe or gets worse.  Be careful of shards of glass on your body or in your belongings.    Fractures, Sprains, and Strains:  Return to the Emergency Department right away if your injured area gets more painful, if the splint or dressing seems to be too tight, if it gets numb or tingly past the injury, or if the area past the injury gets pale, blue, or cold.  Use your crutches if you were given them today. Don t put weight on the injured area until the pain is gone.  Keep the injured area above the level of your heart while laying or sitting down.  This well help lessen the swelling (puffiness) and the pain.  You may use an elastic bandage (Ace  Wrap) if it makes you more comfortable. Wrap it just tight enough to provide mild compression, and loosen it if you get swelling past the bandage.    Note about X-rays: If you had x-rays done today, they were read by your emergency physician. They will also be read later by a radiologist. We will contact you if the radiologist thinks they show something different than the emergency physician did. Remember that there are some fractures (breaks in the bone) that can t be seen right away. Even if your x-rays today were normal, you must see your doctor in clinic to re-check.     Splints:  A splint put on in the Emergency Department is temporary. Your regular doctor or orthopedic doctor will remove it, and replace it with a cast or boot if needed.  Keep the splint dry. Cover it with a  plastic bag when you wash. Even with a plastic bag, you still can t get in water or let water get right on it. If it does get wet, you should come back or see your doctor to have it replaced.  Do not put objects inside the splint to scratch.  If there is an elastic bandage (Ace  Wrap) holding the splint on this may be loosened a little to relieve pressure or pain.  If pain continues return to the Emergency Department right away.  Return if the splint starts cutting into your skin.  Do not remove your splint by yourself unless told to by your doctor. You can t take it off and put it back on again.     Wounds:  Infections can follow many injuries. Watch for fevers, redness spreading from the wound, pus or stitches that open up. Return here or see your doctor if these happen.  There can always be glass, wood, dirt or other things in any wound.  They won t always show up even on x-rays.  If a wound doesn t heal, this may be why, and it is important to follow-up with your regular doctor. Small pieces of glass or other materials may work their way out on their own.  Cuts or scrapes may start to bleed after leaving the Emergency Department.  If this happens, hold pressure on the bleeding area with a clean cloth or put pressure over the bandage.  If the bleeding doesn t stop after you use constant pressure for   hour, you should return to the Emergency Department for further treatment.  Any bandage or dressing put on here should be removed in 12-24 hours, or as your doctor instructs. Remove the dressing sooner if it seems too tight or painful, or if it is getting numb, tingly, or pale past the dressing.  After you take off the dressing, wash the cut or scrape with soap and water once or twice a day.  Apply ointment like Bacitracin  (polypeptide antibiotic) to scrapes or cuts, and keep them covered with a Band-Aid  or gauze if possible, until they heal up or until your stitches are taken out.  Dermabond  or Steri-Strips   should be left alone and will come off by themselves.  Dissolving stitches should go away or fall out within about a week.  Regular stitches need to be taken out by your doctor in clinic.  Call today and schedule an appointment.  Leave your stitches in for as long as you were told today.    Most injuries are preventable!  As your local emergency physicians, we encourage you to:  Wear your seat belt.  Do not talk on your cell phone while driving.  Do not read or send text messages while driving.  Wear a bike or motorcycle helmet.  Wear a helmet while skiing and snowboarding.  Wear personal flotation devices at all times while on the water.  Always have your child in a car seat.  Do not allow children less than 12 years old to ride in the front seat.  Go to the CDC website to find more information on preventing injures:  http://www.cdc.gov/injury/index.html    If you were given a prescription for medicine here today, be sure to read all of the information (including the package insert) that comes with your prescription.  This will include important information about the medicine, its side effects, and any warnings that you need to know about.  The pharmacist who fills the prescription can provide more information and answer questions you may have about the medicine.  If you have questions or concerns that the pharmacist cannot address, please call or return to the Emergency Department.     Opioid Medication Information    Pain medications are among the most commonly prescribed medicines, so we are including this information for all our patients. If you did not receive pain medication or get a prescription for pain medicine, you can ignore it.     You may have been given a prescription for an opioid (narcotic) pain medicine and/or have received a pain medicine while here in the Emergency Department. These medicines can make you drowsy or impaired. You must not drive, operate dangerous equipment, or engage in any other  dangerous activities while taking these medications. If you drive while taking these medications, you could be arrested for DUI, or driving under the influence. Do not drink any alcohol while you are taking these medications.     Opioid pain medications can cause addiction. If you have a history of chemical dependency of any type, you are at a higher risk of becoming addicted to pain medications.  Only take these prescribed medications to treat your pain when all other options have been tried. Take it for as short a time and as few doses as possible. Store your pain pills in a secure place, as they are frequently stolen and provide a dangerous opportunity for children or visitors in your house to start abusing these powerful medications. We will not replace any lost or stolen medicine.  As soon as your pain is better, you should flush all your remaining medication.     Many prescription pain medications contain Tylenol  (acetaminophen), including Vicodin , Tylenol #3 , Norco , Lortab , and Percocet .  You should not take any extra pills of Tylenol  if you are using these prescription medications or you can get very sick.  Do not ever take more than 3000 mg of acetaminophen in any 24 hour period.    All opioids tend to cause constipation. Drink plenty of water and eat foods that have a lot of fiber, such as fruits, vegetables, prune juice, apple juice and high fiber cereal.  Take a laxative if you don t move your bowels at least every other day. Miralax , Milk of Magnesia, Colace , or Senna  can be used to keep you regular.      Remember that you can always come back to the Emergency Department if you are not able to see your regular doctor in the amount of time listed above, if you get any new symptoms, or if there is anything that worries you.    Discharge Instructions  Incidental Findings    An incidental finding is something unexpected that was found while you were being treated today.  While this finding is not  an emergency, you will want to follow up with your primary doctor to determine if anything should be done about it.    These findings can come from:  Checking your vital signs (example: high blood pressure).  Taking your history (example: unexplained weight loss).  The physical exam (example: a heart murmur).  Laboratory study (example: anemia).  X-rays/ultrasound/CT or other imaging (example: an unexplained mass).    Return to the Emergency Department if:  Your condition worsens.  You develop unexpected pain.  You now develop new symptoms or have new concerns.    Follow up with your doctor:  Follow up within the next week to discuss the results of all of your tests and about the main reason for your visit today.  Inform your doctor of what testing you had done today. You may want to obtain copies of your results from the medical records department.    Modern medical technology has become very sensitive.  Unexpected or incidental findings are very common and do not always indicate a problem.  However, sometimes problems are found very early before symptoms have even started. While these findings are not an emergency, this may allow your primary care doctor to start the earliest treatment possible. Sometimes these incidental findings are not discovered until later when the final report is reported or when your primary care doctor compares our finding to your previous studies. Therefore, it is important for you to always review all results and studies with your primary care doctor or clinic after an Emergency Department visit.  If you were given a prescription for medicine here today, be sure to read all of the information (including the package insert) that comes with your prescription.  This will include important information about the medicine, its side effects, and any warnings that you need to know about.  The pharmacist who fills the prescription can provide more information and answer questions you may have  about the medicine.  If you have questions or concerns that the pharmacist cannot address, please call or return to the Emergency Department.   Remember that you can always come back to the Emergency Department if you are not able to see your regular doctor in the amount of time listed above, if you get any new symptoms, or if there is anything that worries you.

## 2022-07-07 NOTE — ED NOTES
This patient was signed out to me by Dr. Zimmer awaiting CT scan of the chest abdomen pelvis after a fall.  I talked to the patient's  who said she had been on a treadmill and fallen.  Thankfully her CT scan did not show rib fracture, pneumothorax, or intra-abdominal or intrathoracic injury.  I discussed the incidental findings the need for follow-up.  The patient did not want any other pain medication.  The patient reported that the Lidoderm patch was controlling her pain.  She ambulated in the ER without significant problems at them both the patient and her  were requesting discharge home.  I discussed all findings and she was discharged per instructions close follow-up was encouraged.     Georgi Guevara MD  07/06/22 2004

## 2022-07-07 NOTE — PROVIDER NOTIFICATION
Patient took her home medications despite discussing on arrival to ED that she should not per policy. Pills were in a pill box and could only show me what she took but wouldn't let writer see them in detail. She states she took her vitamin D and Rytary.

## 2022-07-25 ENCOUNTER — HOSPITAL ENCOUNTER (EMERGENCY)
Facility: CLINIC | Age: 67
Discharge: HOME | End: 2022-07-25
Attending: EMERGENCY MEDICINE | Admitting: EMERGENCY MEDICINE
Payer: COMMERCIAL

## 2022-07-25 ENCOUNTER — APPOINTMENT (OUTPATIENT)
Dept: GENERAL RADIOLOGY | Facility: CLINIC | Age: 67
End: 2022-07-25
Attending: EMERGENCY MEDICINE
Payer: COMMERCIAL

## 2022-07-25 VITALS
OXYGEN SATURATION: 95 % | RESPIRATION RATE: 30 BRPM | DIASTOLIC BLOOD PRESSURE: 72 MMHG | SYSTOLIC BLOOD PRESSURE: 129 MMHG | HEART RATE: 88 BPM | TEMPERATURE: 98.7 F

## 2022-07-25 DIAGNOSIS — U07.1 INFECTION DUE TO 2019 NOVEL CORONAVIRUS: ICD-10-CM

## 2022-07-25 LAB
ALBUMIN SERPL-MCNC: 3.9 G/DL (ref 3.4–5)
ALP SERPL-CCNC: 90 U/L (ref 40–150)
ALT SERPL W P-5'-P-CCNC: 16 U/L (ref 0–50)
ANION GAP SERPL CALCULATED.3IONS-SCNC: 3 MMOL/L (ref 3–14)
AST SERPL W P-5'-P-CCNC: 20 U/L (ref 0–45)
ATRIAL RATE - MUSE: 99 BPM
BASOPHILS # BLD AUTO: 0 10E3/UL (ref 0–0.2)
BASOPHILS NFR BLD AUTO: 0 %
BILIRUB DIRECT SERPL-MCNC: <0.1 MG/DL (ref 0–0.2)
BILIRUB SERPL-MCNC: 0.3 MG/DL (ref 0.2–1.3)
BUN SERPL-MCNC: 12 MG/DL (ref 7–30)
CALCIUM SERPL-MCNC: 9.5 MG/DL (ref 8.5–10.1)
CHLORIDE BLD-SCNC: 105 MMOL/L (ref 94–109)
CO2 SERPL-SCNC: 31 MMOL/L (ref 20–32)
CREAT SERPL-MCNC: 0.69 MG/DL (ref 0.52–1.04)
DIASTOLIC BLOOD PRESSURE - MUSE: NORMAL MMHG
EOSINOPHIL # BLD AUTO: 0.2 10E3/UL (ref 0–0.7)
EOSINOPHIL NFR BLD AUTO: 2 %
ERYTHROCYTE [DISTWIDTH] IN BLOOD BY AUTOMATED COUNT: 14.2 % (ref 10–15)
FLUAV RNA SPEC QL NAA+PROBE: NEGATIVE
FLUBV RNA RESP QL NAA+PROBE: NEGATIVE
GFR SERPL CREATININE-BSD FRML MDRD: >90 ML/MIN/1.73M2
GLUCOSE BLD-MCNC: 139 MG/DL (ref 70–99)
HCT VFR BLD AUTO: 44.6 % (ref 35–47)
HGB BLD-MCNC: 14.2 G/DL (ref 11.7–15.7)
IMM GRANULOCYTES # BLD: 0 10E3/UL
IMM GRANULOCYTES NFR BLD: 0 %
INTERPRETATION ECG - MUSE: NORMAL
LYMPHOCYTES # BLD AUTO: 1.8 10E3/UL (ref 0.8–5.3)
LYMPHOCYTES NFR BLD AUTO: 19 %
MCH RBC QN AUTO: 29.5 PG (ref 26.5–33)
MCHC RBC AUTO-ENTMCNC: 31.8 G/DL (ref 31.5–36.5)
MCV RBC AUTO: 93 FL (ref 78–100)
MONOCYTES # BLD AUTO: 0.6 10E3/UL (ref 0–1.3)
MONOCYTES NFR BLD AUTO: 7 %
NEUTROPHILS # BLD AUTO: 6.5 10E3/UL (ref 1.6–8.3)
NEUTROPHILS NFR BLD AUTO: 72 %
NRBC # BLD AUTO: 0 10E3/UL
NRBC BLD AUTO-RTO: 0 /100
P AXIS - MUSE: 56 DEGREES
PLAT MORPH BLD: ABNORMAL
PLATELET # BLD AUTO: NORMAL 10*3/UL
POTASSIUM BLD-SCNC: 4 MMOL/L (ref 3.4–5.3)
PR INTERVAL - MUSE: 160 MS
PROT SERPL-MCNC: 7.8 G/DL (ref 6.8–8.8)
QRS DURATION - MUSE: 78 MS
QT - MUSE: 344 MS
QTC - MUSE: 441 MS
R AXIS - MUSE: 22 DEGREES
RBC # BLD AUTO: 4.81 10E6/UL (ref 3.8–5.2)
RBC MORPH BLD: ABNORMAL
SARS-COV-2 RNA RESP QL NAA+PROBE: POSITIVE
SODIUM SERPL-SCNC: 139 MMOL/L (ref 133–144)
SYSTOLIC BLOOD PRESSURE - MUSE: NORMAL MMHG
T AXIS - MUSE: 17 DEGREES
TROPONIN I SERPL HS-MCNC: 3 NG/L
VENTRICULAR RATE- MUSE: 99 BPM
WBC # BLD AUTO: 9.1 10E3/UL (ref 4–11)

## 2022-07-25 PROCEDURE — 87637 SARSCOV2&INF A&B&RSV AMP PRB: CPT | Performed by: EMERGENCY MEDICINE

## 2022-07-25 PROCEDURE — 71046 X-RAY EXAM CHEST 2 VIEWS: CPT | Mod: 77

## 2022-07-25 PROCEDURE — 96361 HYDRATE IV INFUSION ADD-ON: CPT

## 2022-07-25 PROCEDURE — 93005 ELECTROCARDIOGRAM TRACING: CPT

## 2022-07-25 PROCEDURE — 71046 X-RAY EXAM CHEST 2 VIEWS: CPT

## 2022-07-25 PROCEDURE — 36415 COLL VENOUS BLD VENIPUNCTURE: CPT | Performed by: EMERGENCY MEDICINE

## 2022-07-25 PROCEDURE — 250N000011 HC RX IP 250 OP 636: Performed by: EMERGENCY MEDICINE

## 2022-07-25 PROCEDURE — 99285 EMERGENCY DEPT VISIT HI MDM: CPT | Mod: 25

## 2022-07-25 PROCEDURE — 99285 EMERGENCY DEPT VISIT HI MDM: CPT | Mod: CS,25

## 2022-07-25 PROCEDURE — C9803 HOPD COVID-19 SPEC COLLECT: HCPCS

## 2022-07-25 PROCEDURE — 96374 THER/PROPH/DIAG INJ IV PUSH: CPT | Mod: 59

## 2022-07-25 PROCEDURE — 258N000003 HC RX IP 258 OP 636: Performed by: EMERGENCY MEDICINE

## 2022-07-25 PROCEDURE — 85048 AUTOMATED LEUKOCYTE COUNT: CPT | Performed by: EMERGENCY MEDICINE

## 2022-07-25 PROCEDURE — 96360 HYDRATION IV INFUSION INIT: CPT

## 2022-07-25 PROCEDURE — 84484 ASSAY OF TROPONIN QUANT: CPT | Performed by: EMERGENCY MEDICINE

## 2022-07-25 PROCEDURE — 94640 AIRWAY INHALATION TREATMENT: CPT

## 2022-07-25 PROCEDURE — 80053 COMPREHEN METABOLIC PANEL: CPT | Performed by: EMERGENCY MEDICINE

## 2022-07-25 PROCEDURE — 250N000013 HC RX MED GY IP 250 OP 250 PS 637: Performed by: EMERGENCY MEDICINE

## 2022-07-25 PROCEDURE — 82248 BILIRUBIN DIRECT: CPT | Performed by: EMERGENCY MEDICINE

## 2022-07-25 RX ORDER — ACETAMINOPHEN 500 MG
1000 TABLET ORAL ONCE
Status: COMPLETED | OUTPATIENT
Start: 2022-07-25 | End: 2022-07-25

## 2022-07-25 RX ORDER — ALBUTEROL SULFATE 90 UG/1
4 AEROSOL, METERED RESPIRATORY (INHALATION) ONCE
Status: COMPLETED | OUTPATIENT
Start: 2022-07-25 | End: 2022-07-25

## 2022-07-25 RX ORDER — PREDNISONE 20 MG/1
TABLET ORAL
Qty: 10 TABLET | Refills: 0 | Status: SHIPPED | OUTPATIENT
Start: 2022-07-25

## 2022-07-25 RX ORDER — LORAZEPAM 2 MG/ML
0.5 INJECTION INTRAMUSCULAR ONCE
Status: COMPLETED | OUTPATIENT
Start: 2022-07-25 | End: 2022-07-25

## 2022-07-25 RX ADMIN — LORAZEPAM 0.5 MG: 2 INJECTION INTRAMUSCULAR; INTRAVENOUS at 05:58

## 2022-07-25 RX ADMIN — SODIUM CHLORIDE, POTASSIUM CHLORIDE, SODIUM LACTATE AND CALCIUM CHLORIDE 500 ML: 600; 310; 30; 20 INJECTION, SOLUTION INTRAVENOUS at 05:58

## 2022-07-25 RX ADMIN — ACETAMINOPHEN 1000 MG: 500 TABLET, FILM COATED ORAL at 05:13

## 2022-07-25 RX ADMIN — ALBUTEROL SULFATE 4 PUFF: 90 AEROSOL, METERED RESPIRATORY (INHALATION) at 05:14

## 2022-07-25 ASSESSMENT — ENCOUNTER SYMPTOMS
SORE THROAT: 1
CONFUSION: 1
SHORTNESS OF BREATH: 1
DYSURIA: 0
VOMITING: 0
COUGH: 1
FEVER: 0

## 2022-07-25 NOTE — ED NOTES
Brief visit to facilitate work-up near shift change    67-year-old female with a history of COPD, sleep apnea, Parkinson's disease, restless leg syndrome here with her .    Symptoms began yesterday with sore throat, myalgias, headache developing sore throat and nonproductive cough and pleuritic right-sided pain.  No known sick contacts.  Did not sleep much due to worsening tremors and restless leg symptoms.  Tried albuterol at home but stated it tasted like metal.    Has been vaccinated and booster for COVID.    Lungs clear, mild tachypnea and prolonged expiratory phase.  Lower extremity myoclonic jerks and tremor.  Bruise and superficial abrasion right antecubital fossa.  Blanching erythematous lesions bilateral lower extremities left greater than right.       notes that she did not take her 10 PM pills and so she was directed to take those here in the emergency room.  This includes medications for Parkinson's, restless legs including Requip and clonazepam.      Ordered EKG, basic blood tests, albuterol inhaler, chest x-ray, COVID swab      MD Timothy Goldstein Jerome Richard, MD  07/25/22 0826

## 2022-07-25 NOTE — ED PROVIDER NOTES
History   Chief Complaint:  Shortness of Breath       HPI   Vi Gee is a 67 year old female with history of COPD who presents with shortness of breath. The patient reports that starting yesterday she had an onset of sore throat, dry cough and shortness of breath. She states that she was confused last night so she did not take any of her medications. The patient denies any vomiting, fever, dysuria and chest pain. The patient denies any sick contacts.       Review of Systems   Constitutional: Negative for fever.   HENT: Positive for sore throat.    Respiratory: Positive for cough and shortness of breath.    Cardiovascular: Negative for chest pain.   Gastrointestinal: Negative for vomiting.   Genitourinary: Negative for dysuria.   Psychiatric/Behavioral: Positive for confusion.   All other systems reviewed and are negative.      Allergies:  Celecoxib  Clindamycin  Epinephrine  Erythromycin    Medications:  Albuterol   Tessalon   Wellbutrin   Rytary  Klonopin   Cymbalta   Neurontin   DuoNeb   Nystatin   Ozempic   Prednisone   Requip   Nayan Burton     Past Medical History:     Acute respiratory failure with hypoxia   COPD   Sleep apnea   Parkinson's disease  Restless leg syndrome    Anxiety   BPD   Hyperlipidemia   Osteopenia   TMJ    Past Surgical History:    Appendectomy   Endometrial ablation   Tonsillectomy and adenoidectomy    Parathyroidectomy     Social History:  The patient presents to the ED alone.   PCP: Kristina Lockwood MD    Physical Exam     Patient Vitals for the past 24 hrs:   BP Temp Temp src Pulse Resp SpO2   07/25/22 0710 -- -- -- -- -- 94 %   07/25/22 0600 127/76 -- -- 99 -- 94 %   07/25/22 0459 -- -- -- -- -- 97 %   07/25/22 0458 -- -- -- -- -- 97 %   07/25/22 0457 -- -- -- -- -- 97 %   07/25/22 0456 -- -- -- -- -- 97 %   07/25/22 0455 -- -- -- -- -- 96 %   07/25/22 0454 -- -- -- -- -- 97 %   07/25/22 0453 -- -- -- -- -- 97 %   07/25/22 0452 -- -- -- -- -- 97 %   07/25/22 0444 134/82  98.7  F (37.1  C) Temporal 88 30 96 %       Physical Exam  Vitals: reviewed by me  General: [place], cooperative, and alert to conversation  Eyes: Tracking well, clear conjunctiva BL  ENT: MMM, midline trachea.   Lungs: No tachypnea, no accessory muscle use. No respiratory distress.  No wheezing heard to auscultation bilaterally  CV: Rate as above, normal S1-S2, no additional heart sounds heard  Abd: Soft, non tender, no guarding, no rebound. Non distended  MSK: no joint effusion.  No evidence of trauma  Skin: No rash  Neuro: Clear speech and no facial droop.  Psych: Not RIS, no e/o AH/VH      Emergency Department Course   ECG  ECG taken at 0524, ECG read at 0528  Normal sinus rhythm    Rate 99 bpm. OK interval 160 ms. QRS duration 78 ms. QT/QTc 344/441 ms. P-R-T axes 56 22 17.     Imaging:  XR Chest 2 Views   Final Result   IMPRESSION: No acute abnormality.        Report per radiology    Laboratory:  Labs Ordered and Resulted from Time of ED Arrival to Time of ED Departure   BASIC METABOLIC PANEL - Abnormal       Result Value    Sodium 139      Potassium 4.0      Chloride 105      Carbon Dioxide (CO2) 31      Anion Gap 3      Urea Nitrogen 12      Creatinine 0.69      Calcium 9.5      Glucose 139 (*)     GFR Estimate >90     INFLUENZA A/B & SARS-COV2 PCR MULTIPLEX - Abnormal    Influenza A PCR Negative      Influenza B PCR Negative      SARS CoV2 PCR Positive (*)    HEPATIC FUNCTION PANEL - Normal    Bilirubin Total 0.3      Bilirubin Direct <0.1      Protein Total 7.8      Albumin 3.9      Alkaline Phosphatase 90      AST 20      ALT 16     TROPONIN I - Normal    Troponin I High Sensitivity 3     CBC WITH PLATELETS AND DIFFERENTIAL    WBC Count 9.1      RBC Count 4.81      Hemoglobin 14.2      Hematocrit 44.6      MCV 93      MCH 29.5      MCHC 31.8      RDW 14.2      Platelet Count        NRBCs per 100 WBC 0      Absolute NRBCs 0.0          Procedures    Emergency Department Course:      Reviewed:  I reviewed nursing notes, vitals and past medical history    Assessments:  0619 I obtained history and examined the patient as noted above.     0742 I rechecked the patient and explained findings.     8:00 AM  Just alerted by laboratory that there are visually adequate platelets with no overt evidence of thrombocytopenia, but unable to give an accurate number given clumping.  Patient has no history of bleeding problems or low platelets    Interventions:  Medications   albuterol (PROVENTIL HFA/VENTOLIN HFA) inhaler (4 puffs Inhalation Given 7/25/22 0514)   lactated ringers BOLUS 500 mL (500 mLs Intravenous New Bag 7/25/22 0558)   acetaminophen (TYLENOL) tablet 1,000 mg (1,000 mg Oral Given 7/25/22 0513)   LORazepam (ATIVAN) injection 0.5 mg (0.5 mg Intravenous Given 7/25/22 0558)     Disposition:  The patient was discharged to home.     Impression & Plan     Medical Decision Making:  This is a pleasant 67-year-old female who presents emergency room with appears to be respiratory infection, and a positive COVID test.  I do think that this explains her sore throat and cough, also note that she has normal chest x-ray and the cough is nonproductive.  She does not desaturate below 90% here, and does have albuterol with a spacer that she is using at home already.  I did not note any wheezing here, but given her history and possible concomitant COPD exacerbation, will provide short steroid burst as well.  Patient tells me she has been vaccinated, and we talked about red flags for when to come back to the ER.  We will plan for discharge home with a pulse oximeter, patient is okay with this plan as well.    Will note that she has multiple contraindications to getting Paxlovid, and that she takes benzos and there is also an interaction with steroids, which the patient does intermittently take.    Diagnosis:    ICD-10-CM    1. Infection due to 2019 novel coronavirus  U07.1        Discharge Medications:  New  Prescriptions    PREDNISONE (DELTASONE) 20 MG TABLET    Take two tablets (= 40mg) each day for 5 (five) days       Scribe Disclosure:  I, Erika Jacob, am serving as a scribe at 6:09 AM on 7/25/2022 to document services personally performed by Jonathan Ching MD based on my observations and the provider's statements to me.              Jonathan Ching MD  07/25/22 5858

## 2022-07-25 NOTE — DISCHARGE INSTRUCTIONS
As we discussed, you do have a coronavirus infection, and also given that your history of COPD, we are giving steroids.  Please do follow with your regular doctor in the next 48 hours, it is very important that you are seen by another healthcare provider.  Please come back to the ER immediately if your oxygen level gets to be below 90% on the pulse oximeter.  Come back with any other concerns you have, or if your symptoms worsen in any way

## 2022-07-26 ENCOUNTER — APPOINTMENT (OUTPATIENT)
Dept: CT IMAGING | Facility: CLINIC | Age: 67
End: 2022-07-26
Attending: EMERGENCY MEDICINE
Payer: COMMERCIAL

## 2022-07-26 ENCOUNTER — HOSPITAL ENCOUNTER (EMERGENCY)
Facility: CLINIC | Age: 67
Discharge: HOME | End: 2022-07-26
Attending: EMERGENCY MEDICINE | Admitting: EMERGENCY MEDICINE
Payer: COMMERCIAL

## 2022-07-26 VITALS
SYSTOLIC BLOOD PRESSURE: 133 MMHG | WEIGHT: 220 LBS | DIASTOLIC BLOOD PRESSURE: 67 MMHG | BODY MASS INDEX: 36.61 KG/M2 | RESPIRATION RATE: 20 BRPM | OXYGEN SATURATION: 95 % | TEMPERATURE: 98.5 F | HEART RATE: 101 BPM

## 2022-07-26 DIAGNOSIS — U07.1 INFECTION DUE TO 2019 NOVEL CORONAVIRUS: ICD-10-CM

## 2022-07-26 DIAGNOSIS — J44.1 COPD EXACERBATION (H): ICD-10-CM

## 2022-07-26 LAB
ANION GAP SERPL CALCULATED.3IONS-SCNC: 2 MMOL/L (ref 3–14)
ATRIAL RATE - MUSE: 101 BPM
BASOPHILS # BLD AUTO: 0 10E3/UL (ref 0–0.2)
BASOPHILS NFR BLD AUTO: 0 %
BUN SERPL-MCNC: 12 MG/DL (ref 7–30)
CALCIUM SERPL-MCNC: 8.9 MG/DL (ref 8.5–10.1)
CHLORIDE BLD-SCNC: 104 MMOL/L (ref 94–109)
CO2 SERPL-SCNC: 32 MMOL/L (ref 20–32)
CREAT SERPL-MCNC: 0.74 MG/DL (ref 0.52–1.04)
D DIMER PPP FEU-MCNC: 0.56 UG/ML FEU (ref 0–0.5)
DIASTOLIC BLOOD PRESSURE - MUSE: NORMAL MMHG
EOSINOPHIL # BLD AUTO: 0.1 10E3/UL (ref 0–0.7)
EOSINOPHIL NFR BLD AUTO: 1 %
ERYTHROCYTE [DISTWIDTH] IN BLOOD BY AUTOMATED COUNT: 14.5 % (ref 10–15)
GFR SERPL CREATININE-BSD FRML MDRD: 88 ML/MIN/1.73M2
GLUCOSE BLD-MCNC: 102 MG/DL (ref 70–99)
HCT VFR BLD AUTO: 41.6 % (ref 35–47)
HGB BLD-MCNC: 13.4 G/DL (ref 11.7–15.7)
HOLD SPECIMEN: NORMAL
HOLD SPECIMEN: NORMAL
IMM GRANULOCYTES # BLD: 0.1 10E3/UL
IMM GRANULOCYTES NFR BLD: 1 %
INTERPRETATION ECG - MUSE: NORMAL
LYMPHOCYTES # BLD AUTO: 1 10E3/UL (ref 0.8–5.3)
LYMPHOCYTES NFR BLD AUTO: 11 %
MCH RBC QN AUTO: 29.8 PG (ref 26.5–33)
MCHC RBC AUTO-ENTMCNC: 32.2 G/DL (ref 31.5–36.5)
MCV RBC AUTO: 93 FL (ref 78–100)
MONOCYTES # BLD AUTO: 0.6 10E3/UL (ref 0–1.3)
MONOCYTES NFR BLD AUTO: 6 %
NEUTROPHILS # BLD AUTO: 7.1 10E3/UL (ref 1.6–8.3)
NEUTROPHILS NFR BLD AUTO: 81 %
NRBC # BLD AUTO: 0 10E3/UL
NRBC BLD AUTO-RTO: 0 /100
P AXIS - MUSE: 48 DEGREES
PLATELET # BLD AUTO: 269 10E3/UL (ref 150–450)
POTASSIUM BLD-SCNC: 3.8 MMOL/L (ref 3.4–5.3)
PR INTERVAL - MUSE: 160 MS
QRS DURATION - MUSE: 80 MS
QT - MUSE: 326 MS
QTC - MUSE: 422 MS
R AXIS - MUSE: 17 DEGREES
RBC # BLD AUTO: 4.49 10E6/UL (ref 3.8–5.2)
SODIUM SERPL-SCNC: 138 MMOL/L (ref 133–144)
SYSTOLIC BLOOD PRESSURE - MUSE: NORMAL MMHG
T AXIS - MUSE: 19 DEGREES
VENTRICULAR RATE- MUSE: 101 BPM
WBC # BLD AUTO: 8.7 10E3/UL (ref 4–11)

## 2022-07-26 PROCEDURE — 99285 EMERGENCY DEPT VISIT HI MDM: CPT | Mod: 25

## 2022-07-26 PROCEDURE — 250N000011 HC RX IP 250 OP 636: Performed by: EMERGENCY MEDICINE

## 2022-07-26 PROCEDURE — 85379 FIBRIN DEGRADATION QUANT: CPT | Performed by: EMERGENCY MEDICINE

## 2022-07-26 PROCEDURE — 85025 COMPLETE CBC W/AUTO DIFF WBC: CPT | Performed by: EMERGENCY MEDICINE

## 2022-07-26 PROCEDURE — 250N000013 HC RX MED GY IP 250 OP 250 PS 637: Performed by: EMERGENCY MEDICINE

## 2022-07-26 PROCEDURE — 71275 CT ANGIOGRAPHY CHEST: CPT

## 2022-07-26 PROCEDURE — 250N000009 HC RX 250: Performed by: EMERGENCY MEDICINE

## 2022-07-26 PROCEDURE — 80048 BASIC METABOLIC PNL TOTAL CA: CPT | Performed by: EMERGENCY MEDICINE

## 2022-07-26 PROCEDURE — 36415 COLL VENOUS BLD VENIPUNCTURE: CPT | Performed by: EMERGENCY MEDICINE

## 2022-07-26 PROCEDURE — 258N000003 HC RX IP 258 OP 636: Performed by: EMERGENCY MEDICINE

## 2022-07-26 RX ORDER — ACETAMINOPHEN 325 MG/1
650 TABLET ORAL ONCE
Status: COMPLETED | OUTPATIENT
Start: 2022-07-26 | End: 2022-07-26

## 2022-07-26 RX ORDER — ALBUTEROL SULFATE 0.83 MG/ML
2.5 SOLUTION RESPIRATORY (INHALATION) EVERY 6 HOURS PRN
Qty: 75 ML | Refills: 0 | Status: SHIPPED | OUTPATIENT
Start: 2022-07-26

## 2022-07-26 RX ORDER — IOPAMIDOL 755 MG/ML
500 INJECTION, SOLUTION INTRAVASCULAR ONCE
Status: COMPLETED | OUTPATIENT
Start: 2022-07-26 | End: 2022-07-26

## 2022-07-26 RX ORDER — IPRATROPIUM BROMIDE AND ALBUTEROL SULFATE 2.5; .5 MG/3ML; MG/3ML
3 SOLUTION RESPIRATORY (INHALATION) ONCE
Status: COMPLETED | OUTPATIENT
Start: 2022-07-26 | End: 2022-07-26

## 2022-07-26 RX ADMIN — IOPAMIDOL 72 ML: 755 INJECTION, SOLUTION INTRAVENOUS at 04:51

## 2022-07-26 RX ADMIN — ACETAMINOPHEN 650 MG: 325 TABLET, FILM COATED ORAL at 02:05

## 2022-07-26 RX ADMIN — SODIUM CHLORIDE 96 ML: 9 INJECTION, SOLUTION INTRAVENOUS at 04:51

## 2022-07-26 RX ADMIN — IPRATROPIUM BROMIDE AND ALBUTEROL SULFATE 3 ML: .5; 3 SOLUTION RESPIRATORY (INHALATION) at 02:05

## 2022-07-26 ASSESSMENT — ENCOUNTER SYMPTOMS
SHORTNESS OF BREATH: 1
ABDOMINAL PAIN: 0
VOMITING: 0
COUGH: 1
SORE THROAT: 1
FEVER: 1

## 2022-07-26 NOTE — ED TRIAGE NOTES
"Presents to ED with c/o cough and fever. Patient was diagnosed with covid today and was told to come to ED if symptoms worsened. Patient stated temp at home was \"almost 102,\" no medications taken PTA.      Triage Assessment     Row Name 07/25/22 3058       Triage Assessment (Adult)    Airway WDL WDL       Respiratory WDL    Respiratory WDL X;cough    Cough Frequency frequent       Cardiac WDL    Cardiac WDL X;rhythm    Cardiac Rhythm tachycardic              "

## 2022-07-26 NOTE — ED PROVIDER NOTES
History   Chief Complaint:  Cough and Fever    HPI   Vi Gee is a 67 year old female who presents with cough and shortness of breath.  The patient has a history of COPD.  She was seen here overnight last night and was diagnosed with COVID-19.  Today is the third day of her symptoms.  She had contraindications for Paxlovid.  She was not hypoxic.  She was started on prednisone which she has been taking.  She uses an albuterol inhaler.  She has a nebulizer machine at home but was reluctant to use it as she felt that it smelled bad about that it may have been .    The patient denies any chest pain.  She has a mild headache.  No vomiting or diarrhea.  She has never had a DVT or PE in the past.  No leg swelling or hemoptysis.  No recent travel    Review of Systems   Constitutional: Positive for fever.   HENT: Positive for congestion and sore throat.    Respiratory: Positive for cough and shortness of breath.    Cardiovascular: Negative for chest pain and leg swelling.   Gastrointestinal: Negative for abdominal pain and vomiting.   All other systems reviewed and are negative.    Allergies:  Celecoxib  Clindamycin  Epinephrine  Erythromycin    Medications:    Valacyclovir   Methylcellulose   Tizanidine   Trazodone   Ropinirole   Albuterol   Benzonatate   Gabapentin   Duloxetine   Clonazepam   Rytary  Pamelor   Wellbutrin     Past Medical History:    Anxiety   BPD  COPD  Skin cancer  Hyperparathyroidism   AMELIA  Parkinson disease  RLS  Organic affective syndrome   Chronic pain syndrome   IBS   paroxysmal atrial tachycardia  Balance problem   Colonic polyps   Chronic diarrhea  Obesity   Osteopenia  IFG  HLD  Multiple closed rib fractures of right side   Pain disorder  Tremor  Yeast vaginitis   Acute suppurative otitis media  Pseudophakia   TMJ  Cortical senile cataract   Gait difficulty   Dysphagia  nonallopathic lesion of cervical, thoracic, and sacral regions  Sciatica   C diff.   Bowel incontinence   Bladder  incontinence  Pelvic weakness and dysfunction   Pelvic pressure   Rectal bleeding  Atypical depressive disease   Paralysis agitans      Past Surgical History:    Open appendectomy   Endometrial ablation   Tonsillectomy   Parathyroidectomy   Tubal ligation   Obstetrical care x 3   [place] teeth extraction   Transvaginal sling   Breast biopsy   Skin cancer excision   Deep brain stimulator placement and replacement   Extracapsular cataract removal     Family History:    Father- lung cancer, cataracts, depression, genetic disorder, IBS, macular degeneration   Mother- alcohol/drug abuse, asthma, COPD, lung cancer, cataract, CAD, dementia, depression, hearing loss, HTN, migraines, osteoarthritis, osteoporosis, stroke   Sister- migraines     Social History:  The patient presents to the ED alone   PCP: Kristina Lockwood MD     Physical Exam     Patient Vitals for the past 24 hrs:   BP Temp Temp src Pulse Resp SpO2 Weight   07/26/22 0530 133/67 -- -- -- -- 95 % --   07/26/22 0445 -- -- -- -- -- 96 % --   07/26/22 0410 -- -- -- -- -- 90 % --   07/26/22 0405 -- -- -- -- -- 90 % --   07/26/22 0400 123/68 -- -- 101 -- -- --   07/26/22 0346 -- -- -- -- -- 93 % --   07/26/22 0345 -- -- -- -- -- 90 % --   07/26/22 0344 -- -- -- -- -- 90 % --   07/26/22 0330 138/75 -- -- 103 -- -- --   07/26/22 0315 128/77 -- -- 105 -- -- --   07/26/22 0300 131/88 -- -- 107 -- -- --   07/26/22 0245 125/78 -- -- 108 -- -- --   07/26/22 0230 130/82 -- -- 102 -- 93 % --   07/26/22 0215 (!) 140/85 -- -- 102 -- 99 % --   07/26/22 0200 (!) 140/79 -- -- -- -- -- --   07/25/22 2152 (!) 148/85 98.5  F (36.9  C) Temporal 111 20 96 % 99.8 kg (220 lb)     Physical Exam  Constitutional:       General: She is not in acute distress.     Appearance: She is not diaphoretic.   HENT:      Head: Atraumatic.      Mouth/Throat:      Pharynx: No oropharyngeal exudate.   Eyes:      General: No scleral icterus.     Pupils: Pupils are equal, round, and reactive to light.    Cardiovascular:      Rate and Rhythm: Regular rhythm. Tachycardia present.      Heart sounds: Normal heart sounds.   Pulmonary:      Effort: Pulmonary effort is normal. No respiratory distress.      Breath sounds: Normal breath sounds.   Abdominal:      General: Bowel sounds are normal.      Palpations: Abdomen is soft.      Tenderness: There is no abdominal tenderness.   Musculoskeletal:         General: No tenderness.   Skin:     General: Skin is warm.      Findings: No rash.   Neurological:      General: No focal deficit present.      Mental Status: She is oriented to person, place, and time.   Psychiatric:         Mood and Affect: Mood normal.         Behavior: Behavior normal.       Emergency Department Course   ECG:  ECG taken at 2323, ECG read at 101  Sinus tachycardia   Otherwise normal ECG  No significant change as compared to prior, dated 06/06/2022.  Rate 101 bpm. UT interval 160 ms. QRS duration 80 ms. QT/QTc 326/422 ms. P-R-T axes 48 17 19.     Imaging:  CT Chest Pulmonary Embolism w Contrast   Final Result   IMPRESSION:   1.  There is no pulmonary embolus, aortic aneurysm or dissection. No acute abnormality.   2.  Bilateral thyroid nodules.   3.  Fatty infiltration of the liver.                        Chest XR,  PA & LAT   Final Result   IMPRESSION: Heart size and pulmonary vascularity normal. The lungs are clear. Mechanical stimulator packs overlying the right and left chest with leads extending into the neck. Plate and screw internal fixation hardware multiple right ribs.        Report per radiology    Laboratory:  Labs Ordered and Resulted from Time of ED Arrival to Time of ED Departure   BASIC METABOLIC PANEL - Abnormal       Result Value    Sodium 138      Potassium 3.8      Chloride 104      Carbon Dioxide (CO2) 32      Anion Gap 2 (*)     Urea Nitrogen 12      Creatinine 0.74      Calcium 8.9      Glucose 102 (*)     GFR Estimate 88     D DIMER QUANTITATIVE - Abnormal    D-Dimer Quantitative  0.56 (*)    CBC WITH PLATELETS AND DIFFERENTIAL    WBC Count 8.7      RBC Count 4.49      Hemoglobin 13.4      Hematocrit 41.6      MCV 93      MCH 29.8      MCHC 32.2      RDW 14.5      Platelet Count 269      % Neutrophils 81      % Lymphocytes 11      % Monocytes 6      % Eosinophils 1      % Basophils 0      % Immature Granulocytes 1      NRBCs per 100 WBC 0      Absolute Neutrophils 7.1      Absolute Lymphocytes 1.0      Absolute Monocytes 0.6      Absolute Eosinophils 0.1      Absolute Basophils 0.0      Absolute Immature Granulocytes 0.1      Absolute NRBCs 0.0       Emergency Department Course:       Reviewed:  I reviewed nursing notes, vitals, past medical history and Care Everywhere    Assessments:  0139 I obtained history and examined the patient as noted above.   0540 I rechecked the patient and explained findings. I discussed discharge with the patient. All questions answered.     Interventions:  0205 Tylenol 650 MG PO  0205 Duoneb 3 mL Nebulization     Disposition:  The patient was discharged to home.     Impression & Plan      Medical Decision Making:  This patient is a pleasant 67-year-old woman who presents to the emergency department with shortness of breath.  She was here yesterday and was diagnosed with COVID-19 in the setting of a COPD exacerbation.  She was started on prednisone.  She says she typically would use albuterol nebulizers but felt that her albuterol was  and smelled bad and symptoms when to use it.  She presents with scattered wheezing.  She was given a nebulizer treatment and overall feels improved.  Given her recurrent symptoms she had lab work-up.  D-dimer slightly elevated which was followed up with a CT scan.  Fortunately we do not see evidence of PE or significant infiltrate.  No pneumothorax or pleural effusion.  The patient feels improved with treatment here in the ED.  I will refill her albuterol for home use.  She is not hypoxic.  We discussed return  precautions.    Covid-19  Vi Gee was evaluated during a global COVID-19 pandemic, which necessitated consideration that the patient might be at risk for infection with the SARS-CoV-2 virus that causes COVID-19.   Applicable protocols for evaluation were followed during the patient's care. COVID-19 was considered as part of the patient's evaluation.    Diagnosis:    ICD-10-CM    1. Infection due to 2019 novel coronavirus  U07.1    2. COPD exacerbation (H)  J44.1        Discharge Medications:  Discharge Medication List as of 7/26/2022  5:44 AM      START taking these medications    Details   !! albuterol (PROVENTIL) (2.5 MG/3ML) 0.083% neb solution Take 1 vial (2.5 mg) by nebulization every 6 hours as needed for shortness of breath / dyspnea or wheezing, Disp-75 mL, R-0, E-Prescribe       !! - Potential duplicate medications found. Please discuss with provider.        Scribe Disclosure:  IShelly, am serving as a scribe at 1:39 AM on 7/26/2022 to document services personally performed by Chan Valdivia MD based on my observations and the provider's statements to me.      Chan Valdivia MD  07/26/22 5778

## 2022-07-26 NOTE — DISCHARGE INSTRUCTIONS
Return to the ER for increased difficulty breathing, oxygen saturation 92% or less, or any new concerns.

## 2022-08-09 ENCOUNTER — HOSPITAL ENCOUNTER (EMERGENCY)
Facility: CLINIC | Age: 67
Discharge: HOME | End: 2022-08-09
Attending: EMERGENCY MEDICINE | Admitting: EMERGENCY MEDICINE
Payer: COMMERCIAL

## 2022-08-09 ENCOUNTER — APPOINTMENT (OUTPATIENT)
Dept: GENERAL RADIOLOGY | Facility: CLINIC | Age: 67
End: 2022-08-09
Attending: EMERGENCY MEDICINE
Payer: COMMERCIAL

## 2022-08-09 VITALS
DIASTOLIC BLOOD PRESSURE: 82 MMHG | OXYGEN SATURATION: 94 % | TEMPERATURE: 98.2 F | HEART RATE: 85 BPM | SYSTOLIC BLOOD PRESSURE: 147 MMHG | RESPIRATION RATE: 20 BRPM

## 2022-08-09 DIAGNOSIS — R05.9 COUGH: ICD-10-CM

## 2022-08-09 DIAGNOSIS — J98.01 ACUTE BRONCHOSPASM: ICD-10-CM

## 2022-08-09 DIAGNOSIS — R09.02 HYPOXIA: ICD-10-CM

## 2022-08-09 LAB
ANION GAP SERPL CALCULATED.3IONS-SCNC: 8 MMOL/L (ref 7–15)
BASOPHILS # BLD AUTO: 0 10E3/UL (ref 0–0.2)
BASOPHILS NFR BLD AUTO: 0 %
BUN SERPL-MCNC: 10.5 MG/DL (ref 8–23)
CALCIUM SERPL-MCNC: 8.9 MG/DL (ref 8.8–10.2)
CHLORIDE SERPL-SCNC: 103 MMOL/L (ref 98–107)
CREAT SERPL-MCNC: 0.73 MG/DL (ref 0.51–0.95)
DEPRECATED HCO3 PLAS-SCNC: 29 MMOL/L (ref 22–29)
EOSINOPHIL # BLD AUTO: 0.2 10E3/UL (ref 0–0.7)
EOSINOPHIL NFR BLD AUTO: 2 %
ERYTHROCYTE [DISTWIDTH] IN BLOOD BY AUTOMATED COUNT: 14.4 % (ref 10–15)
GFR SERPL CREATININE-BSD FRML MDRD: 89 ML/MIN/1.73M2
GLUCOSE SERPL-MCNC: 145 MG/DL (ref 70–99)
HCT VFR BLD AUTO: 44.3 % (ref 35–47)
HGB BLD-MCNC: 13.8 G/DL (ref 11.7–15.7)
HOLD SPECIMEN: NORMAL
HOLD SPECIMEN: NORMAL
IMM GRANULOCYTES # BLD: 0.1 10E3/UL
IMM GRANULOCYTES NFR BLD: 1 %
LYMPHOCYTES # BLD AUTO: 1.4 10E3/UL (ref 0.8–5.3)
LYMPHOCYTES NFR BLD AUTO: 17 %
MCH RBC QN AUTO: 29.4 PG (ref 26.5–33)
MCHC RBC AUTO-ENTMCNC: 31.2 G/DL (ref 31.5–36.5)
MCV RBC AUTO: 94 FL (ref 78–100)
MONOCYTES # BLD AUTO: 0.5 10E3/UL (ref 0–1.3)
MONOCYTES NFR BLD AUTO: 6 %
NEUTROPHILS # BLD AUTO: 6 10E3/UL (ref 1.6–8.3)
NEUTROPHILS NFR BLD AUTO: 74 %
NRBC # BLD AUTO: 0 10E3/UL
NRBC BLD AUTO-RTO: 0 /100
PLATELET # BLD AUTO: 247 10E3/UL (ref 150–450)
POTASSIUM SERPL-SCNC: 5.1 MMOL/L (ref 3.4–5.3)
RBC # BLD AUTO: 4.7 10E6/UL (ref 3.8–5.2)
SODIUM SERPL-SCNC: 140 MMOL/L (ref 136–145)
WBC # BLD AUTO: 8.2 10E3/UL (ref 4–11)

## 2022-08-09 PROCEDURE — 36415 COLL VENOUS BLD VENIPUNCTURE: CPT | Performed by: EMERGENCY MEDICINE

## 2022-08-09 PROCEDURE — 85025 COMPLETE CBC W/AUTO DIFF WBC: CPT | Performed by: EMERGENCY MEDICINE

## 2022-08-09 PROCEDURE — 71046 X-RAY EXAM CHEST 2 VIEWS: CPT

## 2022-08-09 PROCEDURE — 99284 EMERGENCY DEPT VISIT MOD MDM: CPT | Mod: 25

## 2022-08-09 PROCEDURE — 80048 BASIC METABOLIC PNL TOTAL CA: CPT | Performed by: EMERGENCY MEDICINE

## 2022-08-09 ASSESSMENT — ENCOUNTER SYMPTOMS
FEVER: 0
CONSTIPATION: 1
SHORTNESS OF BREATH: 0
COUGH: 1
SORE THROAT: 0
NAUSEA: 0
VOMITING: 0
DIARRHEA: 0

## 2022-08-09 NOTE — ED TRIAGE NOTES
A&O x4, ABCs intact. Pt presents with concern for low oxygen. Pt states that her saturation dropped to 89% but she isn't sure for how long.        Triage Assessment     Row Name 08/09/22 1000       Triage Assessment (Adult)    Airway WDL WDL       Respiratory WDL    Respiratory WDL WDL       Cardiac WDL    Cardiac WDL WDL

## 2022-08-09 NOTE — DISCHARGE INSTRUCTIONS
Discharge Instructions  Bronchospasm    You were seen today for a chest infection or inflammation. If your provider decided this was due to a bacterial infection, you may need an antibiotic. Sometimes these are caused by a virus, and then an antibiotic will not help.     Generally, every Emergency Department visit should have a follow-up clinic visit with either a primary or a specialty clinic/provider. Please follow-up as instructed by your emergency provider today.    Return to the Emergency Department if:  Your breathing gets much worse.  You are very weak, or feel much more ill.  You develop new symptoms, such as chest pain.  You cough up blood.  You are vomiting (throwing up) enough that you cannot keep fluids or your medicine down.    What can I do to help myself?  Fill any prescriptions the provider gave you and take them right away--especially antibiotics. Be sure to finish the whole antibiotic prescription.  You may be given a prescription for an inhaler, which can help loosen tight air passages.  Use this as needed, but not more often than directed. Inhalers work much better when used with a spacer.   You may be given a prescription for a steroid to reduce inflammation. Used long-term, these can have side effects, but for short-term use they are safe. You may notice restlessness or increased appetite.      You may use non-prescription cough or cold medicines. Cough medicines may help, but don t make the cough go away completely.   Avoid smoke, because this can make your symptoms worse. If you smoke, this may be a good time to quit! Consider using nicotine lozenges, gum, or patches to reduce cravings.   If you have a fever, Tylenol  (acetaminophen), Motrin  (ibuprofen), or Advil  (ibuprofen) may help bring fever down and may help you feel more comfortable. Be sure to read and follow the package directions, and ask your provider if you have questions.  Be sure to get your flu shot each year.  For certain  ages, the pneumonia shot can help prevent pneumonia.  If you were given a prescription for medicine here today, be sure to read all of the information (including the package insert) that comes with your prescription.  This will include important information about the medicine, its side effects, and any warnings that you need to know about.  The pharmacist who fills the prescription can provide more information and answer questions you may have about the medicine.  If you have questions or concerns that the pharmacist cannot address, please call or return to the Emergency Department.     Remember that you can always come back to the Emergency Department if you are not able to see your regular provider in the amount of time listed above, if you get any new symptoms, or if there is anything that worries you.

## 2022-08-09 NOTE — ED PROVIDER NOTES
History   Chief Complaint:  hypoxia       The history is provided by the patient.      Vi Gee is a 67 year old female with history of COPD, Parkinson's disease, hyperlipidemia, diabetes mellitus, a stroke and liver disease who presents for evaluation of hypoxia. The patient states that she woke up this morning, used the bathroom, and then had a severe coughing fit which was productive. She states that she did not see what the cough was productive with, but denies having any metallic taste in her mouth. She reports using her at home oximeter to check her oxygen saturation afterwards and finding it to be 89%, so she decided to come to the ED. She notes that she does not use oxygen at home to manage her COPD. She reports having a slight cough for the past few days as well as some whistling when breathing, but states that she currently feels fine, and denies any current shortness of breath. She mentions that she is slightly constipated at baseline, and denies any nausea, vomiting, diarrhea, fevers, sore throat, ear pain, congestion, or new leg swelling or new chest pain. She denies ever being hospitalized for lung problems before. She denies any recent medication changes.     Review of Systems   Constitutional: Negative for fever.   HENT: Negative for congestion, ear pain and sore throat.    Respiratory: Positive for cough. Negative for shortness of breath.    Cardiovascular: Negative for chest pain and leg swelling.   Gastrointestinal: Positive for constipation. Negative for diarrhea, nausea and vomiting.   All other systems reviewed and are negative.        Allergies:  Celecoxib  Clindamycin  Epinephrine  Erythromycin    Medications:  Albuterol neb solution  Tessalon  Bupropion   Sinemet  Klonopin  Cymbalta  Gabapentin  Ipratropium neb solution  Culturell  Nystatin  Ozempic  Prednisone  Ropinirole  Trazodone    Past Medical History:     Acute respiratory failure with hypoxia  COPD (chronic obstructive  pulmonary disease)  Parkinson's disease  Pneumonia due to infectious organism  Anxiety disorder  Dementia due to Parkinson's disease  Hyperparathyroidism  Restless leg syndrome  Irritable bowel syndrome with both constipation and diarrhea  Atrial tachycardia, paroxysmal  Borderline personality disorder  Dysphagia  Colonic polyps  Full incontinence of feces  Genital prolapse  AMELIA (obstructive sleep apnea)  Chronic diarrhea  Obesity  Hyperlipidemia   Paralysis agitans - has deep brain stimulators  Adhesive capsulitis of shoulder  Stroke   MDD (major depressive disorder)  Organic affective syndrome  Chronic pain syndrome  Osteopenia  Multiple closed fractures of ribs of right side  Tremor  Yeast vaginitis  Pseudophakia  Cortical senile cataract  TMJ (temporomandibular joint disorder)  Nonallopathic lesion of thoracic and sacral region  Sciatica  Type II diabetes mellitus   Mastoiditis  Liver disease   Heart murmur    Past Surgical History:    Appendectomy open  Endometrial ablation  Tonsillectomy and adenoidectomy   Forearm/wrist procedure  Vaginal mesh  Parathyroidectomy  Tubal ligation  Prophylaxis retinal detachment, photocoagulation  Replacement deep brain stimulator (bilateral)  Extracapsular cataract removal IOL x2  Deep brain stimulator placement  Incontinence surgery  Skin cancer excision  Breast biopsy    Family History:    Mother: COPD, dementia, alcohol/drug abuse, asthma, lung cancer, CAD, cataract, hearing loss, depression, hypertension, migraines, osteoarthritis, osteoporosis, stroke  Father: lung cancer, cataract, depression, inflammatory bowel disease, macular degeneration  Sister: parkinsonism x2    Social History:  The patient presents to the ED unaccompanied    PCP: Kristina Lockwood    Physical Exam     Patient Vitals for the past 24 hrs:   BP Temp Temp src Pulse Resp SpO2   08/09/22 1226 (!) 147/82 -- -- -- -- 94 %   08/09/22 0959 -- 98.2  F (36.8  C) Temporal 85 20 93 %       Physical  Exam  General: Alert, no acute distress; speaking in full sentences  HEENT:  Moist mucous membranes.  Conjunctiva normal.   CV:  RRR, no m/r/g, skin warm and well perfused  Pulm:  CTAB, no wheezes/ronchi/rales.  No acute distress, breathing comfortably  GI:  Soft, nontender, nondistended.  No rebound or guarding.    MSK:  Moving all extremities.  No focal areas of edema, erythema, or tenderness  Skin:  WWP, no rashes, no lower extremity edema, skin color normal, no diaphoresis    Emergency Department Course     Imaging:  Chest XR,  PA & LAT   Final Result   IMPRESSION: Stable bilateral chest pacemaker devices. No acute   cardiopulmonary disease. Stable right lateral rib postoperative   changes.      ALE HINDS MD            SYSTEM ID:  [id number]        Report per radiology    Laboratory:  Labs Ordered and Resulted from Time of ED Arrival to Time of ED Departure   BASIC METABOLIC PANEL - Abnormal       Result Value    Creatinine 0.73      Sodium 140      Potassium 5.1      Urea Nitrogen 10.5      Chloride 103      Carbon Dioxide (CO2) 29      Anion Gap 8      Glucose 145 (*)     GFR Estimate 89      Calcium 8.9     CBC WITH PLATELETS AND DIFFERENTIAL - Abnormal    WBC Count 8.2      RBC Count 4.70      Hemoglobin 13.8      Hematocrit 44.3      MCV 94      MCH 29.4      MCHC 31.2 (*)     RDW 14.4      Platelet Count 247      % Neutrophils 74      % Lymphocytes 17      % Monocytes 6      % Eosinophils 2      % Basophils 0      % Immature Granulocytes 1      NRBCs per 100 WBC 0      Absolute Neutrophils 6.0      Absolute Lymphocytes 1.4      Absolute Monocytes 0.5      Absolute Eosinophils 0.2      Absolute Basophils 0.0      Absolute Immature Granulocytes 0.1      Absolute NRBCs 0.0          Emergency Department Course:             Reviewed:  I reviewed nursing notes, vitals, past medical history and Care Everywhere    Assessments:  1150 I obtained history and examined the patient as noted above.   1250 I rechecked the  patient and explained findings.     Disposition:  The patient was discharged to home.     Impression & Plan     Medical Decision Makin-year-old female with history of COPD, Parkinson's disease, diabetes presenting to the ER for evaluation of transient hypoxia at home.  Please see below for details of HPI and exam.  She is afebrile, well-appearing, hemodynamically stable.  She does not appear to be in any respiratory distress and oxygen levels are normal on room air.  Lung fields are clear.  Basic lab studies above are unremarkable.  Chest x-ray shows no evidence of pneumonia, pneumothorax, pleural effusion.  Patient denies chest pain or pleuritic pain.  Doubt ACS or PE.  There is no hypoxia while in the emergency department.  This happened during a coughing fit and I suspect mucous plug and bronchospasms from COVID-19 .  With reasonable clinical certainty, patient is safe to discharge home.  Follow-up with PCP regarding her ER visit today and she is understanding and in agreement.  Discussed reasons to return to the ER.  All questions answered prior to discharge.    Diagnosis:    ICD-10-CM    1. Hypoxia  R09.02     resolved, likely mucous plug   2. Cough  R05.9    3. Acute bronchospasm  J98.01        Scribe Disclosure:  CHINO, Jordyn Moore, am serving as a scribe at 11:38 AM on 2022 to document services personally performed by Lázaro Gomez MD based on my observations and the provider's statements to me.              Lázaro Gomez MD  [id number]

## 2022-08-14 ENCOUNTER — APPOINTMENT (OUTPATIENT)
Dept: CT IMAGING | Facility: CLINIC | Age: 67
End: 2022-08-14
Attending: EMERGENCY MEDICINE
Payer: COMMERCIAL

## 2022-08-14 ENCOUNTER — HOSPITAL ENCOUNTER (EMERGENCY)
Facility: CLINIC | Age: 67
Discharge: HOME | End: 2022-08-14
Attending: EMERGENCY MEDICINE | Admitting: EMERGENCY MEDICINE
Payer: COMMERCIAL

## 2022-08-14 VITALS
TEMPERATURE: 99.2 F | RESPIRATION RATE: 18 BRPM | SYSTOLIC BLOOD PRESSURE: 135 MMHG | OXYGEN SATURATION: 94 % | HEART RATE: 107 BPM | DIASTOLIC BLOOD PRESSURE: 76 MMHG

## 2022-08-14 DIAGNOSIS — R53.1 GENERAL WEAKNESS: ICD-10-CM

## 2022-08-14 DIAGNOSIS — S09.90XA MINOR HEAD INJURY WITHOUT LOSS OF CONSCIOUSNESS, INITIAL ENCOUNTER: ICD-10-CM

## 2022-08-14 DIAGNOSIS — R73.9 HYPERGLYCEMIA: ICD-10-CM

## 2022-08-14 DIAGNOSIS — S16.1XXA STRAIN OF NECK MUSCLE, INITIAL ENCOUNTER: ICD-10-CM

## 2022-08-14 LAB
ALBUMIN UR-MCNC: 10 MG/DL
ANION GAP SERPL CALCULATED.3IONS-SCNC: 8 MMOL/L (ref 7–15)
APPEARANCE UR: CLEAR
BASOPHILS # BLD AUTO: 0 10E3/UL (ref 0–0.2)
BASOPHILS NFR BLD AUTO: 0 %
BILIRUB UR QL STRIP: NEGATIVE
BUN SERPL-MCNC: 13.8 MG/DL (ref 8–23)
CALCIUM SERPL-MCNC: 9.1 MG/DL (ref 8.8–10.2)
CHLORIDE SERPL-SCNC: 103 MMOL/L (ref 98–107)
COLOR UR AUTO: YELLOW
CREAT SERPL-MCNC: 0.76 MG/DL (ref 0.51–0.95)
DEPRECATED HCO3 PLAS-SCNC: 29 MMOL/L (ref 22–29)
EOSINOPHIL # BLD AUTO: 0.2 10E3/UL (ref 0–0.7)
EOSINOPHIL NFR BLD AUTO: 3 %
ERYTHROCYTE [DISTWIDTH] IN BLOOD BY AUTOMATED COUNT: 14.2 % (ref 10–15)
GFR SERPL CREATININE-BSD FRML MDRD: 85 ML/MIN/1.73M2
GLUCOSE SERPL-MCNC: 268 MG/DL (ref 70–99)
GLUCOSE UR STRIP-MCNC: 300 MG/DL
HCT VFR BLD AUTO: 44.1 % (ref 35–47)
HGB BLD-MCNC: 13.9 G/DL (ref 11.7–15.7)
HGB UR QL STRIP: NEGATIVE
IMM GRANULOCYTES # BLD: 0 10E3/UL
IMM GRANULOCYTES NFR BLD: 1 %
KETONES UR STRIP-MCNC: ABNORMAL MG/DL
LEUKOCYTE ESTERASE UR QL STRIP: NEGATIVE
LYMPHOCYTES # BLD AUTO: 1.1 10E3/UL (ref 0.8–5.3)
LYMPHOCYTES NFR BLD AUTO: 13 %
MCH RBC QN AUTO: 30 PG (ref 26.5–33)
MCHC RBC AUTO-ENTMCNC: 31.5 G/DL (ref 31.5–36.5)
MCV RBC AUTO: 95 FL (ref 78–100)
MONOCYTES # BLD AUTO: 0.4 10E3/UL (ref 0–1.3)
MONOCYTES NFR BLD AUTO: 4 %
MUCOUS THREADS #/AREA URNS LPF: PRESENT /LPF
NEUTROPHILS # BLD AUTO: 6.5 10E3/UL (ref 1.6–8.3)
NEUTROPHILS NFR BLD AUTO: 79 %
NITRATE UR QL: NEGATIVE
NRBC # BLD AUTO: 0 10E3/UL
NRBC BLD AUTO-RTO: 0 /100
PH UR STRIP: 5 [PH] (ref 5–7)
PLATELET # BLD AUTO: 263 10E3/UL (ref 150–450)
POTASSIUM SERPL-SCNC: 4.3 MMOL/L (ref 3.4–5.3)
RBC # BLD AUTO: 4.64 10E6/UL (ref 3.8–5.2)
RBC URINE: <1 /HPF
SODIUM SERPL-SCNC: 140 MMOL/L (ref 136–145)
SP GR UR STRIP: 1.03 (ref 1–1.03)
SQUAMOUS EPITHELIAL: 1 /HPF
UROBILINOGEN UR STRIP-MCNC: NORMAL MG/DL
WBC # BLD AUTO: 8.2 10E3/UL (ref 4–11)
WBC URINE: <1 /HPF

## 2022-08-14 PROCEDURE — 85025 COMPLETE CBC W/AUTO DIFF WBC: CPT | Performed by: EMERGENCY MEDICINE

## 2022-08-14 PROCEDURE — 81001 URINALYSIS AUTO W/SCOPE: CPT | Performed by: EMERGENCY MEDICINE

## 2022-08-14 PROCEDURE — 36415 COLL VENOUS BLD VENIPUNCTURE: CPT | Performed by: EMERGENCY MEDICINE

## 2022-08-14 PROCEDURE — 72125 CT NECK SPINE W/O DYE: CPT

## 2022-08-14 PROCEDURE — 70450 CT HEAD/BRAIN W/O DYE: CPT

## 2022-08-14 PROCEDURE — 93005 ELECTROCARDIOGRAM TRACING: CPT

## 2022-08-14 PROCEDURE — 80048 BASIC METABOLIC PNL TOTAL CA: CPT | Performed by: EMERGENCY MEDICINE

## 2022-08-14 PROCEDURE — 99285 EMERGENCY DEPT VISIT HI MDM: CPT | Mod: 25

## 2022-08-14 ASSESSMENT — ENCOUNTER SYMPTOMS
FATIGUE: 1
NECK PAIN: 1
NUMBNESS: 0
HEADACHES: 1
DIARRHEA: 0
VOMITING: 0
COUGH: 0
WEAKNESS: 1

## 2022-08-14 ASSESSMENT — ACTIVITIES OF DAILY LIVING (ADL): ADLS_ACUITY_SCORE: 35

## 2022-08-14 NOTE — ED TRIAGE NOTES
A&O x4.  ABC's intact.      Pt arrives with c/o falling last night and this morning hitting head both times. Denies LOC and not on blood thinners. Pt complains of head pain and weakness.

## 2022-08-14 NOTE — ED PROVIDER NOTES
History   Chief Complaint:  Fall       HPI   Vi Gee is a 67 year old female with history of Parkinson's who presents with back of head pain, fatigue, and generalized weakness since her fall yesterday. She had fell backward and hit the back of her head on a wall. This morning she fell while trying to get out of the bath tub. She did not hit her head this morning. Her  found her on the bathroom floor and she was there for about an hour. She also has some neck pain. She denies cough, diarrhea, urine issues, vomiting, numbness or back pain. She has history of frequent falls related to her Parkinson's.      Review of Systems   Constitutional: Positive for fatigue.   Respiratory: Negative for cough.    Gastrointestinal: Negative for diarrhea and vomiting.   Genitourinary: Negative.    Musculoskeletal: Positive for neck pain.   Neurological: Positive for weakness and headaches. Negative for numbness.   All other systems reviewed and are negative.      Allergies:  Celecoxib  Clindamycin  Epinephrine  Erythromycin     Medications:  Albuterol neb solution  Tessalon  Bupropion   Sinemet  Klonopin  Cymbalta  Gabapentin  Ipratropium neb solution  Culturell  Nystatin  Ozempic  Prednisone  Ropinirole  Trazodone     Past Medical History:     Acute respiratory failure with hypoxia  COPD (chronic obstructive pulmonary disease)  Parkinson's disease  Pneumonia due to infectious organism  Anxiety   Dementia due to Parkinson's disease  Hyperparathyroidism  Restless leg syndrome  Irritable bowel syndrome with both constipation and diarrhea  Atrial tachycardia, paroxysmal  Borderline personality disorder  Dysphagia  Colonic polyps  Full incontinence of feces  Genital prolapse  AMELIA (obstructive sleep apnea)  Obesity  Hyperlipidemia   Paralysis agitans - has deep brain stimulators  Adhesive capsulitis of shoulder  Stroke   MDD (major depressive disorder)  Organic affective syndrome  Chronic pain syndrome  Osteopenia  Multiple  closed fractures of ribs of right side  Tremor  Yeast vaginitis  Pseudophakia  Cortical senile cataract  TMJ (temporomandibular joint disorder)  Nonallopathic lesion of thoracic and sacral region  Sciatica  Mastoiditis  Liver disease   Heart murmur     Past Surgical History:    Appendectomy  Endometrial ablation  Tonsillectomy and adenoidectomy   Forearm/wrist procedure  Vaginal mesh  Parathyroidectomy  Tubal ligation  Prophylaxis retinal detachment  Replacement deep brain stimulator   Extracapsular cataract removal  Deep brain stimulator placement  Incontinence surgery  Skin cancer excision  Breast biopsy     Family History:    Mother: COPD, dementia, alcohol/drug abuse, asthma, lung cancer, CAD, cataract, hearing loss, depression, hypertension, migraines, osteoarthritis, osteoporosis, stroke  Father: lung cancer, cataract, depression, inflammatory bowel disease, macular degeneration  Sister: parkinsonism      Social History:  The patient presents to the ED with her   PCP: Kristina Lockwood     Physical Exam     Patient Vitals for the past 24 hrs:   BP Temp Temp src Pulse Resp SpO2   08/14/22 1205 135/76 -- -- -- 18 94 %   08/14/22 0746 (!) 139/99 99.2  F (37.3  C) Oral 107 18 95 %       Physical Exam  Vitals and nursing note reviewed.   Constitutional:       General: She is not in acute distress.     Appearance: Normal appearance. She is not ill-appearing (chronically ill appearing).   HENT:      Head: Normocephalic and atraumatic.      Right Ear: External ear normal.      Left Ear: External ear normal.      Nose: Nose normal.      Mouth/Throat:      Mouth: Mucous membranes are moist.   Eyes:      Extraocular Movements: Extraocular movements intact.      Conjunctiva/sclera: Conjunctivae normal.      Pupils: Pupils are equal, round, and reactive to light.   Cardiovascular:      Rate and Rhythm: Normal rate and regular rhythm.      Heart sounds: No murmur heard.  Pulmonary:      Effort: Pulmonary effort is  normal. No respiratory distress.      Breath sounds: Normal breath sounds. No wheezing, rhonchi or rales.   Abdominal:      General: Abdomen is flat. Bowel sounds are normal. There is no distension.      Palpations: Abdomen is soft.      Tenderness: There is no abdominal tenderness. There is no guarding or rebound.   Musculoskeletal:         General: No deformity or signs of injury.      Cervical back: Tenderness present.   Skin:     General: Skin is warm and dry.      Findings: No rash.   Neurological:      Mental Status: She is alert and oriented to person, place, and time.      GCS: GCS eye subscore is 4. GCS verbal subscore is 5. GCS motor subscore is 6.      Cranial Nerves: No cranial nerve deficit.      Sensory: Sensation is intact.      Motor: No weakness.   Psychiatric:         Behavior: Behavior normal.           Emergency Department Course   ECG  ECG taken at 1044, ECG read at 1047  Sinus tachycardia   No significant changes as compared to prior, dated 7/25/22.  Rate 101 bpm. WI interval 168 ms. QRS duration 80 ms. QT/QTc 360/466 ms. P-R-T axes 58 39 38.     Imaging:  CT Cervical Spine w/o Contrast   Final Result   IMPRESSION:   1.  No fracture or posttraumatic subluxation.   2.  No high-grade spinal canal or neural foraminal stenosis.         CT Head w/o Contrast   Final Result   IMPRESSION:   1.  Bilateral deep brain stimulators again noted.   2.  Otherwise, normal head CT.           Report per radiology    Laboratory:  Labs Ordered and Resulted from Time of ED Arrival to Time of ED Departure   BASIC METABOLIC PANEL - Abnormal       Result Value    Creatinine 0.76      Sodium 140      Potassium 4.3      Urea Nitrogen 13.8      Chloride 103      Carbon Dioxide (CO2) 29      Anion Gap 8      Glucose 268 (*)     GFR Estimate 85      Calcium 9.1     ROUTINE UA WITH MICROSCOPIC REFLEX TO CULTURE - Abnormal    Color Urine Yellow      Appearance Urine Clear      Glucose Urine 300  (*)     Bilirubin Urine  Negative      Ketones Urine Trace (*)     Specific Gravity Urine 1.026      Blood Urine Negative      pH Urine 5.0      Protein Albumin Urine 10  (*)     Urobilinogen Urine Normal      Nitrite Urine Negative      Leukocyte Esterase Urine Negative      Mucus Urine Present (*)     RBC Urine <1      WBC Urine <1      Squamous Epithelials Urine 1     CBC WITH PLATELETS AND DIFFERENTIAL    WBC Count 8.2      RBC Count 4.64      Hemoglobin 13.9      Hematocrit 44.1      MCV 95      MCH 30.0      MCHC 31.5      RDW 14.2      Platelet Count 263      % Neutrophils 79      % Lymphocytes 13      % Monocytes 4      % Eosinophils 3      % Basophils 0      % Immature Granulocytes 1      NRBCs per 100 WBC 0      Absolute Neutrophils 6.5      Absolute Lymphocytes 1.1      Absolute Monocytes 0.4      Absolute Eosinophils 0.2      Absolute Basophils 0.0      Absolute Immature Granulocytes 0.0      Absolute NRBCs 0.0        Emergency Department Course:    Reviewed:  I reviewed nursing notes, vitals, past medical history and Care Everywhere    Assessments:  922 I obtained history and examined the patient as noted above.     1145 I rechecked the patient and explained findings.     Disposition:  The patient was discharged to home.     Impression & Plan     Medical Decision Makin-year-old female with fall and head injury yesterday, now with headache, neck pain and generalized weakness.  Differential diagnosis includes intracranial hemorrhage, concussion, cervical injury, scalp contusion.  Her general weakness may be from the fall and head injury, however she could also have other etiologies such as dehydration, infection, electrolyte abnormality etc. Will check labs, EKG, CT of the head and cervical spine for further evaluation.  She is neurologically intact.    1145 patient's work-up is fairly unremarkable.  Her CTs of her head and C-spine are negative.  Her labs show hyperglycemia, however otherwise are unremarkable.  Patient  feels well and would like to go home.  I updated her on the elevated glucose and recommend she follow-up closely with her primary care doctor this week for recheck of todays complaints and to further evaluate whether she needs to be on medications for her likely diabetes.  Return precautions were discussed with patient and her .      Diagnosis:    ICD-10-CM    1. Minor head injury without loss of consciousness, initial encounter  S09.90XA    2. Strain of neck muscle, initial encounter  S16.1XXA    3. General weakness  R53.1    4. Hyperglycemia  R73.9      Scribe Disclosure:  I, Fiona Lerma, am serving as a scribe at 9:06 AM on 8/14/2022 to document services personally performed by Helder Rg MD based on my observations and the provider's statements to me.          Helder Rg MD  08/14/22 9180

## 2022-08-15 LAB
ATRIAL RATE - MUSE: 101 BPM
DIASTOLIC BLOOD PRESSURE - MUSE: NORMAL MMHG
INTERPRETATION ECG - MUSE: NORMAL
P AXIS - MUSE: 58 DEGREES
PR INTERVAL - MUSE: 168 MS
QRS DURATION - MUSE: 80 MS
QT - MUSE: 360 MS
QTC - MUSE: 466 MS
R AXIS - MUSE: 39 DEGREES
SYSTOLIC BLOOD PRESSURE - MUSE: NORMAL MMHG
T AXIS - MUSE: 38 DEGREES
VENTRICULAR RATE- MUSE: 101 BPM

## 2023-05-26 ENCOUNTER — LAB REQUISITION (OUTPATIENT)
Dept: LAB | Facility: CLINIC | Age: 68
End: 2023-05-26
Payer: COMMERCIAL

## 2023-05-26 DIAGNOSIS — E11.9 TYPE 2 DIABETES MELLITUS WITHOUT COMPLICATIONS (H): ICD-10-CM

## 2023-05-28 ENCOUNTER — LAB REQUISITION (OUTPATIENT)
Dept: LAB | Facility: CLINIC | Age: 68
End: 2023-05-28
Payer: COMMERCIAL

## 2023-05-28 DIAGNOSIS — E11.9 TYPE 2 DIABETES MELLITUS WITHOUT COMPLICATIONS (H): ICD-10-CM

## 2023-05-30 LAB — HBA1C MFR BLD: 6.8 %

## 2023-05-30 PROCEDURE — 36415 COLL VENOUS BLD VENIPUNCTURE: CPT | Mod: ORL | Performed by: FAMILY MEDICINE

## 2023-05-30 PROCEDURE — 83036 HEMOGLOBIN GLYCOSYLATED A1C: CPT | Mod: ORL | Performed by: FAMILY MEDICINE

## 2023-05-30 PROCEDURE — P9604 ONE-WAY ALLOW PRORATED TRIP: HCPCS | Mod: ORL | Performed by: FAMILY MEDICINE

## 2024-01-09 NOTE — ED TRIAGE NOTES
"Sore throat, cough and SOB for 1 day. Denies fever PTA. Attempted albuterol at home but \"it didn't taste right\".      "
APER

## (undated) RX ORDER — BUPIVACAINE HYDROCHLORIDE 5 MG/ML
INJECTION, SOLUTION PERINEURAL
Status: DISPENSED
Start: 2021-12-15